# Patient Record
Sex: FEMALE | Race: WHITE | NOT HISPANIC OR LATINO | URBAN - METROPOLITAN AREA
[De-identification: names, ages, dates, MRNs, and addresses within clinical notes are randomized per-mention and may not be internally consistent; named-entity substitution may affect disease eponyms.]

---

## 2021-10-10 ENCOUNTER — EMERGENCY (EMERGENCY)
Facility: HOSPITAL | Age: 86
LOS: 1 days | Discharge: HOME | End: 2021-10-10
Attending: EMERGENCY MEDICINE
Payer: MEDICARE

## 2021-10-10 VITALS
HEART RATE: 63 BPM | TEMPERATURE: 99 F | RESPIRATION RATE: 17 BRPM | SYSTOLIC BLOOD PRESSURE: 145 MMHG | DIASTOLIC BLOOD PRESSURE: 74 MMHG | OXYGEN SATURATION: 98 % | HEIGHT: 66 IN

## 2021-10-10 PROCEDURE — 90471 IMMUNIZATION ADMIN: CPT

## 2021-10-10 PROCEDURE — 99283 EMERGENCY DEPT VISIT LOW MDM: CPT | Mod: 25

## 2021-10-10 PROCEDURE — 99283 EMERGENCY DEPT VISIT LOW MDM: CPT | Mod: GC

## 2021-10-10 PROCEDURE — 90715 TDAP VACCINE 7 YRS/> IM: CPT

## 2021-10-10 RX ORDER — CEPHALEXIN 500 MG
1 CAPSULE ORAL
Qty: 10 | Refills: 0
Start: 2021-10-10 | End: 2021-10-14

## 2021-10-10 RX ORDER — TETANUS TOXOID, REDUCED DIPHTHERIA TOXOID AND ACELLULAR PERTUSSIS VACCINE, ADSORBED 5; 2.5; 8; 8; 2.5 [IU]/.5ML; [IU]/.5ML; UG/.5ML; UG/.5ML; UG/.5ML
0.5 SUSPENSION INTRAMUSCULAR ONCE
Refills: 0 | Status: COMPLETED | OUTPATIENT
Start: 2021-10-10 | End: 2021-10-10

## 2021-10-10 RX ADMIN — TETANUS TOXOID, REDUCED DIPHTHERIA TOXOID AND ACELLULAR PERTUSSIS VACCINE, ADSORBED 0.5 MILLILITER(S): 5; 2.5; 8; 8; 2.5 SUSPENSION INTRAMUSCULAR at 19:43

## 2021-10-10 NOTE — ED ADULT NURSE NOTE - OBJECTIVE STATEMENT
Assumed pt care at 1902.  pt was scratched on left lower leg by her dog at home.  Pt washed the wound and put iodine on it.  Chronic bilateral lower extremity edema present.  Large skin tear present to left lower leg.

## 2021-10-10 NOTE — ED PROVIDER NOTE - PATIENT PORTAL LINK FT
You can access the FollowMyHealth Patient Portal offered by [facility] by registering at the following website: [email or web address]. By joining [employer]’s FollowMyHealth portal, you will also be able to view your health information using other applications (apps) compatible with our system.

## 2021-10-10 NOTE — ED PROVIDER NOTE - NSFOLLOWUPINSTRUCTIONS_ED_ALL_ED_FT
1. Follow up with your primary care physician within 2-3days for reevaluation.  2.  Return to the Emergency Department for worsening, progressive or any other concerning symptoms.  3.  Keep wound clean and dry.  4. Complete your entire course of antibiotics as prescribed. Do not stop taking antibiotics even if your symptoms improve.     Laceration    A laceration is a cut that goes through all of the layers of the skin and into the tissue that is right under the skin. Some lacerations heal on their own. Others need to be closed with skin adhesive strips, skin glue, stitches (sutures), or staples. Proper laceration care minimizes the risk of infection and helps the laceration to heal better.  If non-absorbable stitches or staples have been placed, they must be taken out within the time frame instructed by your healthcare provider.    SEEK IMMEDIATE MEDICAL CARE IF YOU HAVE ANY OF THE FOLLOWING SYMPTOMS: swelling around the wound, worsening pain, drainage from the wound, red streaking going away from your wound, inability to move finger or toe near the laceration, or discoloration of skin near the laceration.

## 2021-10-10 NOTE — ED PROVIDER NOTE - PHYSICAL EXAMINATION
Gen: NAD, AOx3  Head: NCAT  HEENT: oral mucosa moist normal conjunctiva  Lung: no respiratory distress  CV: normal perfusion  MSK: No edema, no visible deformities, full range of motion in all 4 extremities  Neuro: No focal neurologic deficits  Skin: skin tear present to L anterior shin extending to subcutaneous tissue not amenable to sutures  Psych: normal affect

## 2021-10-10 NOTE — ED PROVIDER NOTE - ATTENDING CONTRIBUTION TO CARE
HPI/PE/ROS/MDM BY ME.       I performed a history and physical exam of the patient and discussed their management with the resident. I reviewed the resident's note and agree with the documented findings and plan of care. My medical decision making and observations are found above.

## 2021-10-10 NOTE — ED PROVIDER NOTE - OBJECTIVE STATEMENT
89yo female with PMH HTN presenting with laceration to L anterior shin. Patient states that she was scratched by family dog, denies dog bite. Last tetanus unknown. Denies AC. Ambulatory upon arrivfal.

## 2021-10-10 NOTE — ED ADULT TRIAGE NOTE - CHIEF COMPLAINT QUOTE
deep wound to left lower leg "scratched or bit by family  dog"  (vaccines UTD) ; + active bleed, pressure dressing placed.  denies anticoag use. unk tetanus.

## 2021-10-10 NOTE — ED PROVIDER NOTE - CLINICAL SUMMARY MEDICAL DECISION MAKING FREE TEXT BOX
Patient with laceration to L shin 2/2 dog scratch, no bite, laceration cleaned and dressed by Dr. Priest, unfortunately skin is too thin for repair with sutures. Will dc home with antibiotics and update tetanus. Allison Alvarado DO

## 2022-12-16 ENCOUNTER — EMERGENCY (EMERGENCY)
Facility: HOSPITAL | Age: 87
LOS: 1 days | Discharge: HOME | End: 2022-12-16
Attending: EMERGENCY MEDICINE
Payer: MEDICARE

## 2022-12-16 VITALS
TEMPERATURE: 98 F | HEART RATE: 72 BPM | RESPIRATION RATE: 18 BRPM | SYSTOLIC BLOOD PRESSURE: 124 MMHG | OXYGEN SATURATION: 99 % | DIASTOLIC BLOOD PRESSURE: 71 MMHG

## 2022-12-16 VITALS
RESPIRATION RATE: 18 BRPM | DIASTOLIC BLOOD PRESSURE: 73 MMHG | TEMPERATURE: 99 F | HEART RATE: 70 BPM | OXYGEN SATURATION: 98 % | SYSTOLIC BLOOD PRESSURE: 129 MMHG | HEIGHT: 65 IN | WEIGHT: 164.91 LBS

## 2022-12-16 LAB
ALBUMIN SERPL ELPH-MCNC: 3.6 G/DL — SIGNIFICANT CHANGE UP (ref 3.3–5.2)
ALP SERPL-CCNC: 82 U/L — SIGNIFICANT CHANGE UP (ref 40–120)
ALT FLD-CCNC: 13 U/L — SIGNIFICANT CHANGE UP
ANION GAP SERPL CALC-SCNC: 11 MMOL/L — SIGNIFICANT CHANGE UP (ref 5–17)
AST SERPL-CCNC: 16 U/L — SIGNIFICANT CHANGE UP
BASOPHILS # BLD AUTO: 0.03 K/UL — SIGNIFICANT CHANGE UP (ref 0–0.2)
BASOPHILS NFR BLD AUTO: 0.3 % — SIGNIFICANT CHANGE UP (ref 0–2)
BILIRUB SERPL-MCNC: 0.6 MG/DL — SIGNIFICANT CHANGE UP (ref 0.4–2)
BUN SERPL-MCNC: 16.1 MG/DL — SIGNIFICANT CHANGE UP (ref 8–20)
CALCIUM SERPL-MCNC: 9.2 MG/DL — SIGNIFICANT CHANGE UP (ref 8.4–10.5)
CHLORIDE SERPL-SCNC: 99 MMOL/L — SIGNIFICANT CHANGE UP (ref 96–108)
CO2 SERPL-SCNC: 26 MMOL/L — SIGNIFICANT CHANGE UP (ref 22–29)
CREAT SERPL-MCNC: 0.79 MG/DL — SIGNIFICANT CHANGE UP (ref 0.5–1.3)
EGFR: 71 ML/MIN/1.73M2 — SIGNIFICANT CHANGE UP
EOSINOPHIL # BLD AUTO: 0.07 K/UL — SIGNIFICANT CHANGE UP (ref 0–0.5)
EOSINOPHIL NFR BLD AUTO: 0.6 % — SIGNIFICANT CHANGE UP (ref 0–6)
GLUCOSE SERPL-MCNC: 94 MG/DL — SIGNIFICANT CHANGE UP (ref 70–99)
HCT VFR BLD CALC: 40.7 % — SIGNIFICANT CHANGE UP (ref 34.5–45)
HGB BLD-MCNC: 13.4 G/DL — SIGNIFICANT CHANGE UP (ref 11.5–15.5)
IMM GRANULOCYTES NFR BLD AUTO: 0.4 % — SIGNIFICANT CHANGE UP (ref 0–0.9)
LACTATE BLDV-MCNC: 1.3 MMOL/L — SIGNIFICANT CHANGE UP (ref 0.5–2)
LYMPHOCYTES # BLD AUTO: 1.25 K/UL — SIGNIFICANT CHANGE UP (ref 1–3.3)
LYMPHOCYTES # BLD AUTO: 11.2 % — LOW (ref 13–44)
MCHC RBC-ENTMCNC: 31.3 PG — SIGNIFICANT CHANGE UP (ref 27–34)
MCHC RBC-ENTMCNC: 32.9 GM/DL — SIGNIFICANT CHANGE UP (ref 32–36)
MCV RBC AUTO: 95.1 FL — SIGNIFICANT CHANGE UP (ref 80–100)
MONOCYTES # BLD AUTO: 1.11 K/UL — HIGH (ref 0–0.9)
MONOCYTES NFR BLD AUTO: 9.9 % — SIGNIFICANT CHANGE UP (ref 2–14)
NEUTROPHILS # BLD AUTO: 8.7 K/UL — HIGH (ref 1.8–7.4)
NEUTROPHILS NFR BLD AUTO: 77.6 % — HIGH (ref 43–77)
PLATELET # BLD AUTO: 217 K/UL — SIGNIFICANT CHANGE UP (ref 150–400)
POTASSIUM SERPL-MCNC: 4 MMOL/L — SIGNIFICANT CHANGE UP (ref 3.5–5.3)
POTASSIUM SERPL-SCNC: 4 MMOL/L — SIGNIFICANT CHANGE UP (ref 3.5–5.3)
PROT SERPL-MCNC: 7.2 G/DL — SIGNIFICANT CHANGE UP (ref 6.6–8.7)
RBC # BLD: 4.28 M/UL — SIGNIFICANT CHANGE UP (ref 3.8–5.2)
RBC # FLD: 12.5 % — SIGNIFICANT CHANGE UP (ref 10.3–14.5)
SODIUM SERPL-SCNC: 135 MMOL/L — SIGNIFICANT CHANGE UP (ref 135–145)
WBC # BLD: 11.21 K/UL — HIGH (ref 3.8–10.5)
WBC # FLD AUTO: 11.21 K/UL — HIGH (ref 3.8–10.5)

## 2022-12-16 PROCEDURE — 83605 ASSAY OF LACTIC ACID: CPT

## 2022-12-16 PROCEDURE — 99284 EMERGENCY DEPT VISIT MOD MDM: CPT | Mod: 25

## 2022-12-16 PROCEDURE — 87040 BLOOD CULTURE FOR BACTERIA: CPT

## 2022-12-16 PROCEDURE — 99284 EMERGENCY DEPT VISIT MOD MDM: CPT | Mod: FS

## 2022-12-16 PROCEDURE — 80053 COMPREHEN METABOLIC PANEL: CPT

## 2022-12-16 PROCEDURE — 73590 X-RAY EXAM OF LOWER LEG: CPT | Mod: 26,RT

## 2022-12-16 PROCEDURE — 96374 THER/PROPH/DIAG INJ IV PUSH: CPT

## 2022-12-16 PROCEDURE — 85025 COMPLETE CBC W/AUTO DIFF WBC: CPT

## 2022-12-16 PROCEDURE — 36415 COLL VENOUS BLD VENIPUNCTURE: CPT

## 2022-12-16 PROCEDURE — 93971 EXTREMITY STUDY: CPT

## 2022-12-16 PROCEDURE — 73590 X-RAY EXAM OF LOWER LEG: CPT

## 2022-12-16 PROCEDURE — 93971 EXTREMITY STUDY: CPT | Mod: 26,RT

## 2022-12-16 RX ORDER — CEFAZOLIN SODIUM 1 G
2000 VIAL (EA) INJECTION ONCE
Refills: 0 | Status: COMPLETED | OUTPATIENT
Start: 2022-12-16 | End: 2022-12-16

## 2022-12-16 RX ORDER — CEFAZOLIN SODIUM 1 G
2000 VIAL (EA) INJECTION ONCE
Refills: 0 | Status: DISCONTINUED | OUTPATIENT
Start: 2022-12-16 | End: 2022-12-16

## 2022-12-16 RX ORDER — CEPHALEXIN 500 MG
1 CAPSULE ORAL
Qty: 28 | Refills: 0
Start: 2022-12-16 | End: 2022-12-22

## 2022-12-16 RX ADMIN — Medication 2000 MILLIGRAM(S): at 14:43

## 2022-12-16 NOTE — ED STATDOCS - OBJECTIVE STATEMENT
89 y/o female with pmhx of HTN on losartan, c/o redness and blister developing to R ankle over the past day that has opened with blood oozing. Daughter states blister originated from bruising that was there for 2 weeks. Denies allergies or hx of DM. No fever.

## 2022-12-16 NOTE — ED ADULT NURSE NOTE - CHIEF COMPLAINT QUOTE
pt states she had a wound to her right leg that popped , started as a blister  1 yesterday 1 today, needs to have it checked, it started out as a bruise  A&Ox3, resp wnl, right leg red

## 2022-12-16 NOTE — ED ADULT NURSE NOTE - OBJECTIVE STATEMENT
Pt comes in complaining of R ankle pain and swelling. As per pt's family members, pt has always had the swelling due to sitting down a lot but she recently hit her R foot against her chair which caused a bruise and two blood blisters. Pt endorses trouble walking on R foot and complains of her R ankle being tender to touch. PMH denied.

## 2022-12-16 NOTE — ED STATDOCS - PATIENT PORTAL LINK FT
You can access the FollowMyHealth Patient Portal offered by [facility] by registering at the following website: [email or web address]. By joining [employer]’s FollowMyHealth portal, you will also be able to view your health information using other applications (apps) compatible with our system.

## 2022-12-16 NOTE — ED STATDOCS - NSFOLLOWUPINSTRUCTIONS_ED_ALL_ED_FT
please continue Antibiotic with meal   leg elevation   please call and follow up with primary care within 2 days and bring the results   come back if any worsen of the redness - swollen - pus drainage - redness pass the current area or any new concern     Cellulitis    Cellulitis is a skin infection caused by bacteria. This condition occurs most often in the arms and lower legs but can occur anywhere over the body. Symptoms include redness, swelling, warm skin, tenderness, and chills/fever. If you were prescribed an antibiotic medicine, take it as told by your health care provider. Do not stop taking the antibiotic even if you start to feel better.    SEEK IMMEDIATE MEDICAL CARE IF YOU HAVE ANY OF THE FOLLOWING SYMPTOMS: worsening fever, red streaks coming from affected area, vomiting or diarrhea, or dizziness/lightheadedness.

## 2022-12-16 NOTE — ED ADULT TRIAGE NOTE - CHIEF COMPLAINT QUOTE
pt states she had a wound to her right leg that popped , started as a blister  1 yesterday 1 today, needs to have it checked, it started out as a bruise  A&Ox3, resp wnl pt states she had a wound to her right leg that popped , started as a blister  1 yesterday 1 today, needs to have it checked, it started out as a bruise  A&Ox3, resp wnl, right leg red

## 2022-12-16 NOTE — ED STATDOCS - CLINICAL SUMMARY MEDICAL DECISION MAKING FREE TEXT BOX
Hx of bruise with redness to RLE. Most Likely cellulites. Will check labs, US of RLE, XR of tib/fib and dose of IV antibiotics.

## 2022-12-16 NOTE — ED STATDOCS - NS_ ATTENDINGSCRIBEDETAILS _ED_A_ED_FT
I, Maninder Ponce, performed the initial face to face bedside interview with this patient regarding history of present illness, review of symptoms and relevant past medical, social and family history.  I completed an independent physical examination.  I was the initial provider who evaluated this patient. I have signed out the follow up of any pending tests (i.e. labs, radiological studies) to the ACP.  I have communicated the patient’s plan of care and disposition with the ACP.  The history, relevant review of systems, past medical and surgical history, medical decision making, and physical examination was documented by the scribe in my presence and I attest to the accuracy of the documentation.

## 2022-12-16 NOTE — ED STATDOCS - PROGRESS NOTE DETAILS
pt is sen by Dr malhotra initially agreed With hx , PE and plan seen the pt at the bed side   explained labs and some elevated WBC explained , pending US.   pt's daughter at the bed side - given the option for OBS and IV abx  they were make decision about it US negative - spoke with pt and pt's daughter still wants to try out pt with tablet will f.u with her pcp   dressing changed .

## 2022-12-21 LAB
CULTURE RESULTS: SIGNIFICANT CHANGE UP
CULTURE RESULTS: SIGNIFICANT CHANGE UP
SPECIMEN SOURCE: SIGNIFICANT CHANGE UP
SPECIMEN SOURCE: SIGNIFICANT CHANGE UP

## 2023-07-26 NOTE — ED ADULT NURSE NOTE - CAS EDN DISCHARGE ASSESSMENT
-- DO NOT REPLY / DO NOT REPLY ALL --  -- Message is from Engagement Center Operations ([facility]) --    Offered Waitlist if Available for the Visit Type? Yes    Caller is requesting an appointment - at a sooner time than what was available.      Caller wants sooner appointment - offered other approved options    Reason for Visit: Is calling stated patient is not able to attend office visit alone and she will not be available on 08/24 but need to be seen for MWV     Is the patient currently scheduled? Yes.  Appointment date:  08/24/2023    Preferred time to be seen: Anytime     Caller Information       Type Contact Phone/Fax    07/26/2023 11:06 AM CDT Phone (Incoming) ana ledbetter (Emergency Contact) [phone]          Alternative phone number: none    Can a detailed message be left? Yes    Message Turnaround:     [place]:    Please give this turnaround time to the caller:   \"This message will be sent to [state Provider's name]. The clinical team will fulfill your request as soon as they review your message.\"   Alert and oriented to person, place and time

## 2024-06-09 DIAGNOSIS — M25.562 PAIN IN RIGHT KNEE: ICD-10-CM

## 2024-06-09 DIAGNOSIS — M25.561 PAIN IN RIGHT KNEE: ICD-10-CM

## 2024-06-11 ENCOUNTER — APPOINTMENT (OUTPATIENT)
Dept: ORTHOPEDIC SURGERY | Facility: CLINIC | Age: 89
End: 2024-06-11
Payer: MEDICARE

## 2024-06-11 VITALS
WEIGHT: 163 LBS | DIASTOLIC BLOOD PRESSURE: 81 MMHG | SYSTOLIC BLOOD PRESSURE: 124 MMHG | HEIGHT: 65 IN | BODY MASS INDEX: 27.16 KG/M2 | HEART RATE: 58 BPM

## 2024-06-11 PROCEDURE — 99203 OFFICE O/P NEW LOW 30 MIN: CPT | Mod: 25

## 2024-06-11 PROCEDURE — G2211 COMPLEX E/M VISIT ADD ON: CPT

## 2024-06-11 PROCEDURE — 73564 X-RAY EXAM KNEE 4 OR MORE: CPT | Mod: 50

## 2024-06-11 PROCEDURE — 20610 DRAIN/INJ JOINT/BURSA W/O US: CPT | Mod: 50

## 2024-06-11 RX ORDER — DICLOFENAC SODIUM 1% 10 MG/G
1 GEL TOPICAL DAILY
Qty: 1 | Refills: 1 | Status: ACTIVE | COMMUNITY
Start: 2024-06-11 | End: 1900-01-01

## 2024-06-11 NOTE — PHYSICAL EXAM
[Normal] : Gait: normal [de-identified] : GENERAL APPEARANCE: Well nourished and hydrated, pleasant, alert, and oriented x 3. Appears their stated age.  HEENT: Normocephalic, extraocular eye motion intact. Nasal septum midline. Oral cavity clear. External auditory canal clear.  RESPIRATORY: Breath sounds clear and audible in all lobes. No wheezing, No accessory muscle use. CARDIOVASCULAR: No apparent abnormalities. No lower leg edema. No varicosities. Pedal pulses are palpable. NEUROLOGIC: Sensation is normal, no muscle weakness in the upper or lower extremities. DERMATOLOGIC: No apparent skin lesions, moist, warm, no rash. SPINE: Cervical spine appears normal and moves freely; thoracic spine appears normal and moves freely; lumbosacral spine appears normal and moves freely, normal, nontender. MUSCULOSKELETAL: Hands, wrists, and elbows are normal and move freely, shoulders are normal and move freely.  Psychiatric: Oriented to person, place, and time, insight and judgement were intact and the affect was normal.  [de-identified] : Musculoskeletal: Left knee exam shows no effusion, ROM is 0-115 degrees, no instability, no pain with Samreen, no joint line tenderness. Right knee exam shows no effusion, ROM is 0-115 degrees, no instability, no pain with Samreen, no joint line tenderness. 5/5 motor strength in bilateral lower extremities. Sensory: Intact in bilateral lower extremities. DTRs: Biceps, brachioradialis, triceps, patellar, ankle and plantar 2+ and symmetric bilaterally. Pulses: dorsalis pedis, posterior tibial, femoral, popliteal, and radial 2+ and symmetric bilaterally.  [de-identified] : 4 views of the bilateral knees obtained the office today show no acute fracture or dislocation.  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  For the right knee there is severe medial joint space narrowing bone-on-bone osteoarthritis tricompartmental degenerative changes consistent with Kellgren-Jonathan grade 4 changes

## 2024-06-11 NOTE — PROCEDURE
[de-identified] : Euflexxa # 1 Right knee Discussed at length with the patient the planned Euflexxa injection. The risks, benefits, convalescence and alternatives were reviewed. The possible side effects discussed included but were not limited to: pain, swelling, heat and redness. There symptoms are generally mild but if they are extensive then contact the office. Giving pain relievers by mouth such as NSAIDs or Tylenol can generally treat the reactions to Euflexxa. Rare cases of infection have been noted. Rash, hives and itching may occur post injection. If you have muscle pain or cramps, flushing and or swelling of the face, rapid heart beat, nausea, dizziness, fever, chills, headache, difficulty breathing, swelling in the arms or legs, or have a prickly feeling of your skin, contact a health care provider immediately.  Following this discussion, the knee was prepped with betadine and under sterile condition the Euflexxa injection was performed with a 22 gauge needle. The needle was introduced into the joint, aspiration was performed to ensure intra-articular placement and the medication was injected. Upon withdrawal of the needle the site was cleaned with alcohol and a bandaid applied. The patient tolerated the injection well and there were no adverse effects. Post injection instructions included no strenuous activity for 24 hours, cryotherapy and if there are any adverse effects to contact the office.  Euflexxa # 1 Left knee Discussed at length with the patient the planned Euflexxa injection. The risks, benefits, convalescence and alternatives were reviewed. The possible side effects discussed included but were not limited to: pain, swelling, heat and redness. There symptoms are generally mild but if they are extensive then contact the office. Giving pain relievers by mouth such as NSAIDs or Tylenol can generally treat the reactions to Euflexxa. Rare cases of infection have been noted. Rash, hives and itching may occur post injection. If you have muscle pain or cramps, flushing and or swelling of the face, rapid heart beat, nausea, dizziness, fever, chills, headache, difficulty breathing, swelling in the arms or legs, or have a prickly feeling of your skin, contact a health care provider immediately.  Following this discussion, the knee was prepped with betadine and under sterile condition the Euflexxa injection was performed with a 22 gauge needle. The needle was introduced into the joint, aspiration was performed to ensure intra-articular placement and the medication was injected. Upon withdrawal of the needle the site was cleaned with alcohol and a bandaid applied. The patient tolerated the injection well and there were no adverse effects. Post injection instructions included no strenuous activity for 24 hours, cryotherapy and if there are any adverse effects to contact the office.

## 2024-06-11 NOTE — ADDENDUM
[FreeTextEntry1] : This note was written by Snow Clinton, acting as the  for Dr. Jansen. This note accurately reflects the work and decisions made by Dr. Jansen.

## 2024-06-11 NOTE — REASON FOR VISIT
[Initial Visit] : an initial visit for [Family Member] : family member [FreeTextEntry2] : bilateral knee pain

## 2024-06-11 NOTE — DISCUSSION/SUMMARY
[Medication Risks Reviewed] : Medication risks reviewed [Surgical risks reviewed] : Surgical risks reviewed [de-identified] : Patient is a 89-year-old female with severe bilateral knee osteoarthritis presenting today for initial evaluation.  At this time she is not a surgical candidate.  She like to continue with conservative treatment.  I gave her injection Euflexxa in the bilateral knees which she tolerated well.  We discussed low impact activity and exercise.  I recommended diclofenac gel.  I will see her back in 1 week for repeat injection.  All questions were asked and answered

## 2024-06-11 NOTE — HISTORY OF PRESENT ILLNESS
[Pain Location] : pain [] : right & left knee [Worsening] : worsening [___ mths] : [unfilled] month(s) ago [Constant] : ~He/She~ states the symptoms seem to be constant [Walking] : worsened by walking [Knee Flexion] : worsened with knee flexion [Knee Extension] : worsened with knee extension [de-identified] : 89 year old female presents to the office today for initial visit for her bilateral knee pain. Despite effort to relieve pain, including viscosupplementation, anti-inflammatory cream, rest, and Aleve. The patient denies any falls or trauma and has been using conservative treatment. No physical therapy used for relief of pain. No constitutional symptoms noted. [Physical Therapy] : not relieved by physical therapy [de-identified] : viscosupplementation, anti-inflammatory cream, rest, and Aleve.

## 2024-06-11 NOTE — REVIEW OF SYSTEMS
[Joint Pain] : joint pain [Joint Stiffness] : joint stiffness [Negative] : Heme/Lymph [FreeTextEntry9] : bilateral knee pain

## 2024-06-18 RX ORDER — HYALURONATE SODIUM 20 MG/2 ML
20 SYRINGE (ML) INTRAARTICULAR
Qty: 2 | Refills: 0 | Status: ACTIVE | OUTPATIENT
Start: 2024-06-11

## 2024-06-19 ENCOUNTER — APPOINTMENT (OUTPATIENT)
Dept: ORTHOPEDIC SURGERY | Facility: CLINIC | Age: 89
End: 2024-06-19
Payer: MEDICARE

## 2024-06-19 VITALS
HEART RATE: 62 BPM | BODY MASS INDEX: 27.16 KG/M2 | WEIGHT: 163 LBS | SYSTOLIC BLOOD PRESSURE: 124 MMHG | HEIGHT: 65 IN | DIASTOLIC BLOOD PRESSURE: 82 MMHG

## 2024-06-19 PROCEDURE — 20610 DRAIN/INJ JOINT/BURSA W/O US: CPT | Mod: 50

## 2024-06-19 NOTE — HISTORY OF PRESENT ILLNESS
[de-identified] : 89-year-old female presents office for Euflexxa injection 2 out of 3 to bilateral knees.

## 2024-06-19 NOTE — PROCEDURE
[de-identified] : Euflexxa # 2 Right knee Discussed at length with the patient the planned Euflexxa injection. The risks, benefits, convalescence and alternatives were reviewed. The possible side effects discussed included but were not limited to: pain, swelling, heat and redness. There symptoms are generally mild but if they are extensive then contact the office. Giving pain relievers by mouth such as NSAIDs or Tylenol can generally treat the reactions to Euflexxa. Rare cases of infection have been noted. Rash, hives and itching may occur post injection. If you have muscle pain or cramps, flushing and or swelling of the face, rapid heart beat, nausea, dizziness, fever, chills, headache, difficulty breathing, swelling in the arms or legs, or have a prickly feeling of your skin, contact a health care provider immediately.  Following this discussion, the knee was prepped with betadine and under sterile condition the Euflexxa injection was performed with a 22 gauge needle. The needle was introduced into the joint, aspiration was performed to ensure intra-articular placement and the medication was injected. Upon withdrawal of the needle the site was cleaned with alcohol and a bandaid applied. The patient tolerated the injection well and there were no adverse effects. Post injection instructions included no strenuous activity for 24 hours, cryotherapy and if there are any adverse effects to contact the office.  Euflexxa # 2 Left knee Discussed at length with the patient the planned Euflexxa injection. The risks, benefits, convalescence and alternatives were reviewed. The possible side effects discussed included but were not limited to: pain, swelling, heat and redness. There symptoms are generally mild but if they are extensive then contact the office. Giving pain relievers by mouth such as NSAIDs or Tylenol can generally treat the reactions to Euflexxa. Rare cases of infection have been noted. Rash, hives and itching may occur post injection. If you have muscle pain or cramps, flushing and or swelling of the face, rapid heart beat, nausea, dizziness, fever, chills, headache, difficulty breathing, swelling in the arms or legs, or have a prickly feeling of your skin, contact a health care provider immediately.  Following this discussion, the knee was prepped with betadine and under sterile condition the Euflexxa injection was performed with a 22 gauge needle. The needle was introduced into the joint, aspiration was performed to ensure intra-articular placement and the medication was injected. Upon withdrawal of the needle the site was cleaned with alcohol and a bandaid applied. The patient tolerated the injection well and there were no adverse effects. Post injection instructions included no strenuous activity for 24 hours, cryotherapy and if there are any adverse effects to contact the office.

## 2024-06-19 NOTE — DISCUSSION/SUMMARY
[Medication Risks Reviewed] : Medication risks reviewed [Surgical risks reviewed] : Surgical risks reviewed [de-identified] : 89-year-old female presents to the office status post Euflexxa injection 2 out of 3 to bilateral knees.  Tolerated well.  Will follow-up in 1 week for repeat injection.  All questions addressed, patient agreement with plan.

## 2024-06-26 ENCOUNTER — APPOINTMENT (OUTPATIENT)
Dept: ORTHOPEDIC SURGERY | Facility: CLINIC | Age: 89
End: 2024-06-26
Payer: MEDICARE

## 2024-06-26 DIAGNOSIS — M17.0 BILATERAL PRIMARY OSTEOARTHRITIS OF KNEE: ICD-10-CM

## 2024-06-26 PROCEDURE — 20610 DRAIN/INJ JOINT/BURSA W/O US: CPT | Mod: 50

## 2024-07-22 ENCOUNTER — NON-APPOINTMENT (OUTPATIENT)
Age: 89
End: 2024-07-22

## 2025-01-17 ENCOUNTER — INPATIENT (INPATIENT)
Facility: HOSPITAL | Age: 89
LOS: 4 days | Discharge: NURSING, REHAB OR LONG-TERM CARE | DRG: 93 | End: 2025-01-22
Attending: STUDENT IN AN ORGANIZED HEALTH CARE EDUCATION/TRAINING PROGRAM | Admitting: HOSPITALIST
Payer: MEDICARE

## 2025-01-17 VITALS
RESPIRATION RATE: 20 BRPM | DIASTOLIC BLOOD PRESSURE: 75 MMHG | TEMPERATURE: 98 F | SYSTOLIC BLOOD PRESSURE: 164 MMHG | OXYGEN SATURATION: 98 % | HEART RATE: 96 BPM

## 2025-01-17 DIAGNOSIS — R47.81 SLURRED SPEECH: ICD-10-CM

## 2025-01-17 LAB
ALBUMIN SERPL ELPH-MCNC: 3.2 G/DL — LOW (ref 3.3–5.2)
ALP SERPL-CCNC: 80 U/L — SIGNIFICANT CHANGE UP (ref 40–120)
ALT FLD-CCNC: 32 U/L — SIGNIFICANT CHANGE UP
ANION GAP SERPL CALC-SCNC: 12 MMOL/L — SIGNIFICANT CHANGE UP (ref 5–17)
ANISOCYTOSIS BLD QL: SLIGHT — SIGNIFICANT CHANGE UP
APPEARANCE UR: CLEAR — SIGNIFICANT CHANGE UP
APTT BLD: 36.6 SEC — HIGH (ref 24.5–35.6)
AST SERPL-CCNC: 37 U/L — HIGH
BACTERIA # UR AUTO: ABNORMAL /HPF
BASOPHILS # BLD AUTO: 0.12 K/UL — SIGNIFICANT CHANGE UP (ref 0–0.2)
BASOPHILS NFR BLD AUTO: 1.7 % — SIGNIFICANT CHANGE UP (ref 0–2)
BILIRUB SERPL-MCNC: 0.3 MG/DL — LOW (ref 0.4–2)
BILIRUB UR-MCNC: NEGATIVE — SIGNIFICANT CHANGE UP
BUN SERPL-MCNC: 30.2 MG/DL — HIGH (ref 8–20)
CALCIUM SERPL-MCNC: 9.3 MG/DL — SIGNIFICANT CHANGE UP (ref 8.4–10.5)
CAST: 1 /LPF — SIGNIFICANT CHANGE UP (ref 0–4)
CHLORIDE SERPL-SCNC: 96 MMOL/L — SIGNIFICANT CHANGE UP (ref 96–108)
CO2 SERPL-SCNC: 25 MMOL/L — SIGNIFICANT CHANGE UP (ref 22–29)
COLOR SPEC: YELLOW — SIGNIFICANT CHANGE UP
CREAT SERPL-MCNC: 0.93 MG/DL — SIGNIFICANT CHANGE UP (ref 0.5–1.3)
DIFF PNL FLD: NEGATIVE — SIGNIFICANT CHANGE UP
EGFR: 57 ML/MIN/1.73M2 — LOW
EOSINOPHIL # BLD AUTO: 0.12 K/UL — SIGNIFICANT CHANGE UP (ref 0–0.5)
EOSINOPHIL NFR BLD AUTO: 1.7 % — SIGNIFICANT CHANGE UP (ref 0–6)
GIANT PLATELETS BLD QL SMEAR: PRESENT — SIGNIFICANT CHANGE UP
GLUCOSE SERPL-MCNC: 79 MG/DL — SIGNIFICANT CHANGE UP (ref 70–99)
GLUCOSE UR QL: NEGATIVE MG/DL — SIGNIFICANT CHANGE UP
HCT VFR BLD CALC: 30.7 % — LOW (ref 34.5–45)
HGB BLD-MCNC: 10.2 G/DL — LOW (ref 11.5–15.5)
INR BLD: 0.98 RATIO — SIGNIFICANT CHANGE UP (ref 0.85–1.16)
KETONES UR-MCNC: ABNORMAL MG/DL
LEUKOCYTE ESTERASE UR-ACNC: NEGATIVE — SIGNIFICANT CHANGE UP
LYMPHOCYTES # BLD AUTO: 1.01 K/UL — SIGNIFICANT CHANGE UP (ref 1–3.3)
LYMPHOCYTES # BLD AUTO: 14.7 % — SIGNIFICANT CHANGE UP (ref 13–44)
MACROCYTES BLD QL: SLIGHT — SIGNIFICANT CHANGE UP
MANUAL SMEAR VERIFICATION: SIGNIFICANT CHANGE UP
MCHC RBC-ENTMCNC: 30.6 PG — SIGNIFICANT CHANGE UP (ref 27–34)
MCHC RBC-ENTMCNC: 33.2 G/DL — SIGNIFICANT CHANGE UP (ref 32–36)
MCV RBC AUTO: 92.2 FL — SIGNIFICANT CHANGE UP (ref 80–100)
METAMYELOCYTES # FLD: 0.9 % — HIGH (ref 0–0)
METAMYELOCYTES NFR BLD: 0.9 % — HIGH (ref 0–0)
MONOCYTES # BLD AUTO: 0.41 K/UL — SIGNIFICANT CHANGE UP (ref 0–0.9)
MONOCYTES NFR BLD AUTO: 6 % — SIGNIFICANT CHANGE UP (ref 2–14)
NEUTROPHILS # BLD AUTO: 4.69 K/UL — SIGNIFICANT CHANGE UP (ref 1.8–7.4)
NEUTROPHILS NFR BLD AUTO: 68.1 % — SIGNIFICANT CHANGE UP (ref 43–77)
NITRITE UR-MCNC: NEGATIVE — SIGNIFICANT CHANGE UP
PH UR: 7 — SIGNIFICANT CHANGE UP (ref 5–8)
PLAT MORPH BLD: NORMAL — SIGNIFICANT CHANGE UP
PLATELET # BLD AUTO: 144 K/UL — LOW (ref 150–400)
POTASSIUM SERPL-MCNC: 4.7 MMOL/L — SIGNIFICANT CHANGE UP (ref 3.5–5.3)
POTASSIUM SERPL-SCNC: 4.7 MMOL/L — SIGNIFICANT CHANGE UP (ref 3.5–5.3)
PROT SERPL-MCNC: 6.4 G/DL — LOW (ref 6.6–8.7)
PROT UR-MCNC: NEGATIVE MG/DL — SIGNIFICANT CHANGE UP
PROTHROM AB SERPL-ACNC: 11.4 SEC — SIGNIFICANT CHANGE UP (ref 9.9–13.4)
RBC # BLD: 3.33 M/UL — LOW (ref 3.8–5.2)
RBC # FLD: 15.7 % — HIGH (ref 10.3–14.5)
RBC BLD AUTO: ABNORMAL
RBC CASTS # UR COMP ASSIST: 1 /HPF — SIGNIFICANT CHANGE UP (ref 0–4)
SMUDGE CELLS # BLD: PRESENT — SIGNIFICANT CHANGE UP
SODIUM SERPL-SCNC: 133 MMOL/L — LOW (ref 135–145)
SP GR SPEC: >1.03 — HIGH (ref 1–1.03)
SQUAMOUS # UR AUTO: 1 /HPF — SIGNIFICANT CHANGE UP (ref 0–5)
TROPONIN T, HIGH SENSITIVITY RESULT: 32 NG/L — SIGNIFICANT CHANGE UP (ref 0–51)
UROBILINOGEN FLD QL: 0.2 MG/DL — SIGNIFICANT CHANGE UP (ref 0.2–1)
VARIANT LYMPHS # BLD: 6.9 % — HIGH (ref 0–6)
VARIANT LYMPHS NFR BLD MANUAL: 6.9 % — HIGH (ref 0–6)
WBC # BLD: 6.88 K/UL — SIGNIFICANT CHANGE UP (ref 3.8–10.5)
WBC # FLD AUTO: 6.88 K/UL — SIGNIFICANT CHANGE UP (ref 3.8–10.5)
WBC UR QL: 0 /HPF — SIGNIFICANT CHANGE UP (ref 0–5)

## 2025-01-17 PROCEDURE — 99285 EMERGENCY DEPT VISIT HI MDM: CPT | Mod: GC

## 2025-01-17 PROCEDURE — 71045 X-RAY EXAM CHEST 1 VIEW: CPT | Mod: 26

## 2025-01-17 PROCEDURE — 74176 CT ABD & PELVIS W/O CONTRAST: CPT | Mod: 26

## 2025-01-17 PROCEDURE — 99223 1ST HOSP IP/OBS HIGH 75: CPT

## 2025-01-17 PROCEDURE — 70450 CT HEAD/BRAIN W/O DYE: CPT | Mod: 26,XU

## 2025-01-17 PROCEDURE — 72125 CT NECK SPINE W/O DYE: CPT | Mod: 26

## 2025-01-17 PROCEDURE — 93010 ELECTROCARDIOGRAM REPORT: CPT

## 2025-01-17 PROCEDURE — 70496 CT ANGIOGRAPHY HEAD: CPT | Mod: 26

## 2025-01-17 PROCEDURE — 71250 CT THORAX DX C-: CPT | Mod: 26

## 2025-01-17 PROCEDURE — 73562 X-RAY EXAM OF KNEE 3: CPT | Mod: 26,RT

## 2025-01-17 PROCEDURE — 73502 X-RAY EXAM HIP UNI 2-3 VIEWS: CPT | Mod: 26,RT

## 2025-01-17 PROCEDURE — 70498 CT ANGIOGRAPHY NECK: CPT | Mod: 26

## 2025-01-17 PROCEDURE — 0042T: CPT

## 2025-01-17 PROCEDURE — 93970 EXTREMITY STUDY: CPT | Mod: 26

## 2025-01-17 PROCEDURE — 73700 CT LOWER EXTREMITY W/O DYE: CPT | Mod: 26,RT

## 2025-01-17 PROCEDURE — 73552 X-RAY EXAM OF FEMUR 2/>: CPT | Mod: 26,50

## 2025-01-17 RX ORDER — ACETAMINOPHEN, DIPHENHYDRAMINE HCL, PHENYLEPHRINE HCL 325; 25; 5 MG/1; MG/1; MG/1
3 TABLET ORAL AT BEDTIME
Refills: 0 | Status: DISCONTINUED | OUTPATIENT
Start: 2025-01-17 | End: 2025-01-20

## 2025-01-17 RX ORDER — ACETAMINOPHEN 160 MG/5ML
650 SUSPENSION ORAL EVERY 6 HOURS
Refills: 0 | Status: DISCONTINUED | OUTPATIENT
Start: 2025-01-17 | End: 2025-01-22

## 2025-01-17 RX ORDER — ENOXAPARIN SODIUM 100 MG/ML
40 INJECTION SUBCUTANEOUS EVERY 24 HOURS
Refills: 0 | Status: DISCONTINUED | OUTPATIENT
Start: 2025-01-17 | End: 2025-01-20

## 2025-01-17 RX ORDER — ATORVASTATIN CALCIUM 80 MG/1
40 TABLET, FILM COATED ORAL AT BEDTIME
Refills: 0 | Status: DISCONTINUED | OUTPATIENT
Start: 2025-01-17 | End: 2025-01-20

## 2025-01-17 RX ORDER — ONDANSETRON 4 MG/1
4 TABLET, ORALLY DISINTEGRATING ORAL EVERY 8 HOURS
Refills: 0 | Status: DISCONTINUED | OUTPATIENT
Start: 2025-01-17 | End: 2025-01-20

## 2025-01-17 RX ORDER — ASPIRIN 81 MG/1
81 TABLET, COATED ORAL DAILY
Refills: 0 | Status: DISCONTINUED | OUTPATIENT
Start: 2025-01-17 | End: 2025-01-18

## 2025-01-17 RX ORDER — MAGNESIUM, ALUMINUM HYDROXIDE 200-225/5
30 SUSPENSION, ORAL (FINAL DOSE FORM) ORAL EVERY 4 HOURS
Refills: 0 | Status: DISCONTINUED | OUTPATIENT
Start: 2025-01-17 | End: 2025-01-20

## 2025-01-17 NOTE — ED ADULT NURSE NOTE - ED STAT RN HANDOFF DETAILS
Pt report given to CDU RN Emmett. Pt remains on telebox and . Pt is resting in stretcher comfortably at this time, no apparent distress noted at this time. Pt safety maintained. Pt denies any complaints at this time. Pt family at bedside. Pending MRI.

## 2025-01-17 NOTE — H&P ADULT - ASSESSMENT
Writer called patient's mother. Mother reports that they were at [employer] yesterday and last night started getting a headache. Patient had Tylenol prior to bed last night. Mother reports patient still has a mild headache this morning. Mother denies recent injury or emergent symptoms (see protocol documentation). Writer gave home care and callback advice per protocol, mother verbalized understanding.      Reason for Disposition  • [1] MILD headache AND [2] present < 72 hours    Protocols used: HEADACHE-P-AH     89 yo female with pmhx HTN presenting to the ED with worsening left facial droop since 08:00 this morning.     Facial Droop r/o CVA  -Admit to telemetry  -Neuro checks and vitals signs Q4  -Out of TPA window  -Start Aspirin 81 mg, Atorvastatin 40 mg   -Check TTE  -A1c, TSH, Lipid panel in the am  -Trend Trops, CPK x3  -No role for for permissive HTN, sxs started 2 weeks ago  -Neuro consulted, appreciate recs  -PT/OT/SLP evaluations  -NPO pending dysphagia screen, then can resume with puree diet      Fall  -Unwitnessed fall prior to arrival, and second fall while here  -Imaging initially concerning for hip fx (Xray) but CT did not show fracture, just severe his arthrosis  -Ortho evaluated and signed off, no restrictions  -Fall risk protocol  -PT consulted    HTN  -Continue Atenolol 25 mg daily  -Continue Losartan 100 mg daily    VTEppx: Lovenox, SCDs  GOC:  Dispo: Pending PT eval, likely [facility] vs long term placement 89 yo female with pmhx HTN presenting to the ED with worsening left facial droop since 08:00 this morning.     Facial Droop r/o CVA  -Admit to telemetry  -Neuro checks and vitals signs Q4  -Out of TPA window  -Start Aspirin 81 mg, Atorvastatin 40 mg   -Check TTE  -A1c, TSH, Lipid panel in the am  -Trend Trops, CPK x3  -No role for for permissive HTN, sxs started 2 weeks ago  -Neuro consulted, appreciate recs  -PT/OT/SLP evaluations  -NPO pending dysphagia screen, then can resume with puree diet    Fall  -Unwitnessed fall prior to arrival, and second fall while here  -Imaging initially concerning for hip fx (Xray) but CT did not show fracture, just severe his arthrosis  -Ortho evaluated and signed off, no restrictions  -Fall risk protocol  -PT consulted    HTN  -Continue Atenolol 25 mg daily  -Continue Losartan 100 mg daily    Med rec: Obtained from Dr. Meija, please verify with daughter in am    VTEppx: Lovenox, SCDs  GOC: Could not reach family, please address with family in am as patient is AAOx2  Dispo: Pending PT eval, likely [facility] vs long term placement

## 2025-01-17 NOTE — H&P ADULT - NSHPPHYSICALEXAM_GEN_ALL_CORE
Vital Signs Last 24 Hrs  T(C): 36.6 (17 Jan 2025 16:18), Max: 36.7 (17 Jan 2025 14:25)  T(F): 97.9 (17 Jan 2025 16:18), Max: 98 (17 Jan 2025 14:25)  HR: 55 (18 Jan 2025 00:45) (55 - 96)  BP: 169/63 (18 Jan 2025 00:45) (157/71 - 169/63)  BP(mean): --  RR: 18 (18 Jan 2025 00:45) (18 - 20)  SpO2: 95% (18 Jan 2025 00:45) (95% - 99%)    Parameters below as of 18 Jan 2025 00:45  Patient On (Oxygen Delivery Method): room air    General: Age-appearing, in no acute distress  Head: Normocephalic, atraumatic  ENMT: EOMI, neck supple  Cardiovascular: +S1, S2; Regular rate and rhythm, no murmurs, rubs, gallops  Respiratory: CTA BL, no wheezes, rales, rhonchi  Gastrointestinal: Abdomen soft, non-tender, +BS in all 4 quadrants  Extremities: No clubbing, cyanosis, or edema  Vascular: 2+ pulses, cap refill < 2 seconds  Neuro: Left facial droop, AAOx2, sensation intact BL  Musculoskeletal: Normal tone, no deformities  Skin: Warm, dry; no acute rash seen  Psych: Appropriate, cooperative

## 2025-01-17 NOTE — ED ADULT NURSE NOTE - OBJECTIVE STATEMENT
Pt presents to the ED for AMS and slurred speech as per family. Left sided facial droop noted, Code stroke called. Pt is AOx0, PERRLA, and breathing is even and unlabored. Pt grimaced when the R. hip was palpated, leg is abducted and shorter then the left. Edema noted bilaterally to the lower legs. Family states "she has Pt presents to the ED c/o AMS and slurred speech as per family. LKW [id number] as per family. Left sided facial droop noted, Code stroke called. Pt is AOx0, PERRLA, breathing is even and unlabored bilaterally. Pt grimaced when the R. hip was palpated, leg is abducted and shorter then the left. +2 pitting edema noted bilaterally to the lower legs, blle is new as per family. Family states "she hasn't been herself the last 2 weeks." Pt is on the CM, NSR,  in place. Bed is locked and in the lowest position.

## 2025-01-17 NOTE — ED PROVIDER NOTE - ATTENDING CONTRIBUTION TO CARE
Dr. Oquendo: I personally saw the patient with the resident and performed a substantive portion of the visit. I performed a face to face bedside interview with patient regarding history of present illness, review of symptoms and past medical history. I completed an independent physical exam and all aspects of medical decision making. I have discussed patient's plan of care with resident. I agree with note as stated above, having amended the EMR as needed to reflect my findings. This includes HISTORY OF PRESENT ILLNESS, HIV, PAST MEDICAL/SURGICAL/FAMILY/SOCIAL HISTORY, ALLERGIES AND HOME MEDICATIONS, ROS, PHYSICAL EXAM, MEDICAL DECISION MAKING and any PROGRESS NOTES during the time I functioned as the attending physician for this patient.  SEE MDM

## 2025-01-17 NOTE — ED ADULT TRIAGE NOTE - CHIEF COMPLAINT QUOTE
Patient presents to ER C/O left sided facial droop, AMS and weakness, family reported to EMS weakness X 1 week worsened this AM whe patient fell, patient is currently awake, slurred speech noted, Code stroke activated, resp even/unlabored.

## 2025-01-17 NOTE — H&P ADULT - NSHPLABSRESULTS_GEN_ALL_CORE
10.2   6.88  )-----------( 144      ( 17 Jan 2025 14:35 )             30.7     17 Jan 2025 14:35    133    |  96     |  30.2   ----------------------------<  79     4.7     |  25.0   |  0.93     Ca    9.3        17 Jan 2025 14:35    TPro  6.4    /  Alb  3.2    /  TBili  0.3    /  DBili  x      /  AST  37     /  ALT  32     /  AlkPhos  80     17 Jan 2025 14:35    PT/INR - ( 17 Jan 2025 14:35 )   PT: 11.4 sec;   INR: 0.98 ratio         PTT - ( 17 Jan 2025 14:35 )  PTT:36.6 sec  CAPILLARY BLOOD GLUCOSE      POCT Blood Glucose.: 86 mg/dL (17 Jan 2025 14:28)    LIVER FUNCTIONS - ( 17 Jan 2025 14:35 )  Alb: 3.2 g/dL / Pro: 6.4 g/dL / ALK PHOS: 80 U/L / ALT: 32 U/L / AST: 37 U/L / GGT: x           Urinalysis Basic - ( 17 Jan 2025 19:58 )    Color: Yellow / Appearance: Clear / SG: >1.030 / pH: x  Gluc: x / Ketone: Trace mg/dL  / Bili: Negative / Urobili: 0.2 mg/dL   Blood: x / Protein: Negative mg/dL / Nitrite: Negative   Leuk Esterase: Negative / RBC: 1 /HPF / WBC 0 /HPF   Sq Epi: x / Non Sq Epi: 1 /HPF / Bacteria: Moderate /HPF    < from: US Duplex Venous Lower Ext Complete, Bilateral (01.17.25 @ 20:45) >    IMPRESSION:  No evidence of deep venous thrombosis in either lower extremity.      < end of copied text >    < from: CT Lower Extremity No Cont, Right (01.17.25 @ 20:30) >    IMPRESSION:    No evidence of acute fracture or dislocation.    Severe right hip arthrosis.    < end of copied text >    < from: CT Abdomen and Pelvis No Cont (01.17.25 @ 20:29) >    IMPRESSION: No acute abnormality.    < end of copied text >    < from: Xray Chest 1 View AP/PA. (01.17.25 @ 19:12) >    IMPRESSION:  Bilateral moderate pleural effusions and/or lower zone airspace   consolidations.    --- End of Report ---    < end of copied text >    < from: Xray Hip 2-3 Views, Right (01.17.25 @ 19:11) >    IMPRESSION: Severe RIGHT hip femoral acetabular superior sclerotic joint   space narrowing with 2.2 cm lateral fracture fragment.    --- End of Report ---< from: CT Brain Perfusion Maps Stroke (01.17.25 @ 14:57) >    CT perfusion:  No evidence of CT perfusion deficit.      CTA of the intracranial circulation.  No evidence of stenosis. No evidence of aneurysm.      CTA of the extracranial circulation.  No evidence of carotid stenosis.    < end of copied text >    < from: CT Angio Neck Stroke Protocol w/ IV Cont (01.17.25 @ 14:57) >    IMPRESSION:    CT of the brain:  No acute intracranial injury. Microvascular disease. Tiny bilateral basal   ganglia, external capsule and thalamic chronic lacunar infarcts.

## 2025-01-17 NOTE — H&P ADULT - HISTORY OF PRESENT ILLNESS
89 yo female with pmhx HTN presenting to the ED with worsening left facial droop since 08:00 this morning.  89 yo female with pmhx HTN presenting to the ED with worsening left facial droop since 08:00 this morning. Patient is currently AAOx2 and confused. I attempted to reach daughter who she lives with, Chen Beatty (number in chart), but did not get an answer so history mostly obtained from the chart. Patient lives with daughter who is her primary caretaker. Daughter brought patient in after being found on the ground this am. She has a left facial droop that apparently worsened this am, but initially started 2 weeks ago. She has no history of stroke in the past. Patient is AAOx2, basline unkown, but per ED chart, daughter states patient has been declining over the past year, and even more rapidly in the last two weeks. Patient do not endorse any complaints now.

## 2025-01-17 NOTE — ED PROVIDER NOTE - PROGRESS NOTE DETAILS
Ortho consulted for right femoral neck fracture. Still pending CT trauma scans. Remains HD stable.   Samer Michael Loaiza MD CT without traumatic fractures. Remains AxO1, otherwise HD stable. Patient to be admitted at this time for stroke eval/MRI in the AM. Ortho consulted for a possible right femoral neck fracture. Still pending CT trauma scans. Remains HD stable.   Samer Michael Loaiza MD

## 2025-01-17 NOTE — ED ADULT NURSE REASSESSMENT NOTE - NS ED NURSE REASSESS COMMENT FT1
Patient straight cathed for urine using sterile technique. Second RN present to confirm sterility. Explained procedure as it was being done. Pt tolerated procedure well. Sterile specimens collected and sent to lab as ordered. Pt comfort and safety provided.

## 2025-01-17 NOTE — ED ADULT NURSE NOTE - NSFALLHARMRISKINTERV_ED_ALL_ED

## 2025-01-17 NOTE — ED PROVIDER NOTE - CLINICAL SUMMARY MEDICAL DECISION MAKING FREE TEXT BOX
Darshana Oquendo MD, Attending      Gen: Well appearing in NAD   Head: NC/AT  Neck: trachea midline  Resp:  No distress  Ext: no deformities  Neuro:  A&O appears non focal  Skin:  Warm and dry as visualized  Psych:  Normal affect and mood    ddx includes, but is not limited to the following:  plan:  update: see progress notes Darshana Oquendo MD, Attending  89-year-old female past medical history of hypertension, no diuretic, presents with  worsening left facial droop since 8 AM this morning.  Per EMS, patient has been having roughly 2 weeks of facial droop.  Patient also may have had a fall this morning.   In the ED patient is AOx self.     Additional history obtained from daughter at bedside, states patient was found on the floor this morning, unwitnessed fall.   Also daughter is patient's primary caregiver, no help at home, daughter does not feel like she can take care of patient given her slow mental status decline over the past year, and acute worsening in the past 2 weeks.    GEN: Patient awake alert NAD.   HEENT: normocephalic, atraumatic, + L facial droop, slurred speech forehead sparing  CARDIAC: RRR, S1, S2, no murmur.   PULM: CTA B/L no wheeze, rhonchi, rales.   ABD: soft NT, ND  MSK: Moving all extremities, + pitting edema b/l  NEURO: A&Ox1, no weakness of upper or lower extremity, sensation intact.   SKIN: warm, dry, no rash.  ddx includes, but is not limited to the following: stroke, uti, acute fracture. traumatic injury/ hemorrhage.   plan: stroke imaging, CT and xrays to ro acute injuries, UA, neuro consult for MRI. Anticipate admission.   update: see progress notes Darshana Oquendo MD, Attending  89-year-old female past medical history of hypertension, no diuretic, presents with  worsening left facial droop since 8 AM this morning.  Per EMS, patient has been having roughly 2 weeks of facial droop.  Patient also may have had a fall this morning.   In the ED patient is AOx self.     Additional history obtained from daughter at bedside, states patient was found on the floor this morning, unwitnessed fall.   Also daughter is patient's primary caregiver, no help at home, daughter does not feel like she can take care of patient given her slow mental status decline over the past year, and acute worsening in the past 2 weeks.    GEN: Patient awake alert NAD.   HEENT: normocephalic, atraumatic, + L facial droop, slurred speech forehead sparing  CARDIAC: RRR, S1, S2, no murmur.   PULM: CTA B/L no wheeze, rhonchi, rales.   ABD: soft NT, ND  MSK: Moving all extremities, + pitting edema b/l  NEURO: A&Ox1, no weakness of upper or lower extremity, sensation intact.   SKIN: warm, dry, no rash.  ddx includes, but is not limited to the following: stroke, uti, acute fracture. traumatic injury/ hemorrhage.   plan: stroke imaging, CT and xrays to ro acute injuries, UA, neuro consult for MRI. Anticipate admission.   update: pt signed out to PM team pending CT and admission.

## 2025-01-17 NOTE — CONSULT NOTE ADULT - SUBJECTIVE AND OBJECTIVE BOX
Pt Name: HEMANT DURAND    MRN: [id number]      Patient is a 89y Female presenting to the emergency department s/p fall. Patient [place], has family at bedside. Patient lives w daughter and uses wheelchair. Unable to ambulate secondary to hip pain and weakness. Patient is able to stand for some ADLs such as getting dressed and for bathing, transfers to chair and bed. Patient has had 3 falls over past 2 weeks and seems to be mildly confused. Patient admitted for stroke workup. Patient has "baseline pain" in her right hip. If she tries to ambulate it hurts. Yet nothing more than normal.   Family expresses concern about her B/L LE edema.           REVIEW OF SYSTEMS      General: denies SOB or CP	    Musculoskeletal:	 see HPI 	    ROS is otherwise negative.      PAST MEDICAL & SURGICAL HISTORY:  HTN (hypertension)      No significant past surgical history          Allergies: No Known Allergies      Medications:     FAMILY HISTORY:  No pertinent family history in first degree relatives    : non-contributory    Social History:     Ambulation: Wheelchair                           10.2   6.88  )-----------( 144      ( 17 Jan 2025 14:35 )             30.7     01-17    133[L]  |  96  |  30.2[H]  ----------------------------<  79  4.7   |  25.0  |  0.93    Ca    9.3      17 Jan 2025 14:35    TPro  6.4[L]  /  Alb  3.2[L]  /  TBili  0.3[L]  /  DBili  x   /  AST  37[H]  /  ALT  32  /  AlkPhos  80  01-17      PHYSICAL EXAM:    Vital Signs Last 24 Hrs  T(C): 36.6 (17 Jan 2025 16:18), Max: 36.7 (17 Jan 2025 14:25)  T(F): 97.9 (17 Jan 2025 16:18), Max: 98 (17 Jan 2025 14:25)  HR: 95 (17 Jan 2025 16:18) (95 - 96)  BP: 157/71 (17 Jan 2025 16:18) (157/71 - 164/75)  BP(mean): --  RR: 19 (17 Jan 2025 16:18) (19 - 20)  SpO2: 99% (17 Jan 2025 16:18) (98% - 99%)    Parameters below as of 17 Jan 2025 16:18  Patient On (Oxygen Delivery Method): room air      Daily     Daily     Appearance: Alert, responsive, in no acute distress.  Neurological: Sensation is grossly intact to light touch.   Skin: no rash on visible skin. Skin is clean, dry and intact.   Vascular: B/L LE pitting edema noted  Right LE stiff w attempted ROM of right hip.       Imaging Studies: ACC: [id number] EXAM:  CT LWR EXT RT   ORDERED BY: CHIO OH     PROCEDURE DATE:  01/17/2025          INTERPRETATION:  HISTORY: Pain.    Helical CT imaging of the right lower extremity from the level of the hip   to the level of the tibia/fibula was performed without intravenous   contrast. Sagittal and coronal reformats were provided. 3-D reformats   were performed on a separate workstation.    Correlation is made with radiographs obtained same day.    FINDINGS:    No evidence of acute fracture or dislocation.    Severe right hip arthrosis with bone-on-bone contact and remodeling of   the femoral head. Multiple loose intra-articular bodies. Evidence of   acetabular dysplasia. Moderate tricompartmental arthrosis of the knee,   most prominent in the medial tibiofemoral compartment. No evidence of   knee joint effusion.    Subcutaneous edema about the knee. Atherosclerotic disease.    Excreted intravenous contrast material within the urinary bladder.   Colonic diverticulosis.    IMPRESSION:    No evidence of acute fracture or dislocation.    Severe right hip arthrosis.    --- End of Report ---    SHANNON MOORE MD; Attending Radiologist  This document has been electronically signed. Jan 17 2025  8:55PM      A/P:  Pt is a  89y Female with severe right hip OA/hip dysplasia     PLAN:   * Pain control as needed  * No orthopedic surgical intervention required.  * Ortho will sign off

## 2025-01-18 ENCOUNTER — RESULT REVIEW (OUTPATIENT)
Age: 89
End: 2025-01-18

## 2025-01-18 LAB
A1C WITH ESTIMATED AVERAGE GLUCOSE RESULT: 5.1 % — SIGNIFICANT CHANGE UP (ref 4–5.6)
ANION GAP SERPL CALC-SCNC: 12 MMOL/L — SIGNIFICANT CHANGE UP (ref 5–17)
BUN SERPL-MCNC: 25.7 MG/DL — HIGH (ref 8–20)
CALCIUM SERPL-MCNC: 9.4 MG/DL — SIGNIFICANT CHANGE UP (ref 8.4–10.5)
CHLORIDE SERPL-SCNC: 98 MMOL/L — SIGNIFICANT CHANGE UP (ref 96–108)
CHOLEST SERPL-MCNC: 146 MG/DL — SIGNIFICANT CHANGE UP
CO2 SERPL-SCNC: 25 MMOL/L — SIGNIFICANT CHANGE UP (ref 22–29)
CREAT SERPL-MCNC: 0.81 MG/DL — SIGNIFICANT CHANGE UP (ref 0.5–1.3)
EGFR: 68 ML/MIN/1.73M2 — SIGNIFICANT CHANGE UP
ESTIMATED AVERAGE GLUCOSE: 100 MG/DL — SIGNIFICANT CHANGE UP (ref 68–114)
GLUCOSE SERPL-MCNC: 74 MG/DL — SIGNIFICANT CHANGE UP (ref 70–99)
HCT VFR BLD CALC: 30.4 % — LOW (ref 34.5–45)
HDLC SERPL-MCNC: 78 MG/DL — SIGNIFICANT CHANGE UP
HGB BLD-MCNC: 9.9 G/DL — LOW (ref 11.5–15.5)
LACTATE SERPL-SCNC: 0.8 MMOL/L — SIGNIFICANT CHANGE UP (ref 0.5–2)
LIPID PNL WITH DIRECT LDL SERPL: 56 MG/DL — SIGNIFICANT CHANGE UP
MCHC RBC-ENTMCNC: 29.8 PG — SIGNIFICANT CHANGE UP (ref 27–34)
MCHC RBC-ENTMCNC: 32.6 G/DL — SIGNIFICANT CHANGE UP (ref 32–36)
MCV RBC AUTO: 91.6 FL — SIGNIFICANT CHANGE UP (ref 80–100)
NON HDL CHOLESTEROL: 68 MG/DL — SIGNIFICANT CHANGE UP
PLATELET # BLD AUTO: 138 K/UL — LOW (ref 150–400)
POTASSIUM SERPL-MCNC: 4.6 MMOL/L — SIGNIFICANT CHANGE UP (ref 3.5–5.3)
POTASSIUM SERPL-SCNC: 4.6 MMOL/L — SIGNIFICANT CHANGE UP (ref 3.5–5.3)
RBC # BLD: 3.32 M/UL — LOW (ref 3.8–5.2)
RBC # FLD: 15.6 % — HIGH (ref 10.3–14.5)
SODIUM SERPL-SCNC: 135 MMOL/L — SIGNIFICANT CHANGE UP (ref 135–145)
TRIGL SERPL-MCNC: 57 MG/DL — SIGNIFICANT CHANGE UP
TSH SERPL-MCNC: 6.05 UIU/ML — HIGH (ref 0.27–4.2)
WBC # BLD: 6.97 K/UL — SIGNIFICANT CHANGE UP (ref 3.8–10.5)
WBC # FLD AUTO: 6.97 K/UL — SIGNIFICANT CHANGE UP (ref 3.8–10.5)

## 2025-01-18 PROCEDURE — 93306 TTE W/DOPPLER COMPLETE: CPT | Mod: 26

## 2025-01-18 PROCEDURE — 70551 MRI BRAIN STEM W/O DYE: CPT | Mod: 26

## 2025-01-18 PROCEDURE — 99233 SBSQ HOSP IP/OBS HIGH 50: CPT

## 2025-01-18 RX ORDER — PIPERACILLIN SODIUM AND TAZOBACTAM SODIUM 2; 250 G/50ML; MG/50ML
3.38 INJECTION, POWDER, FOR SOLUTION INTRAVENOUS ONCE
Refills: 0 | Status: COMPLETED | OUTPATIENT
Start: 2025-01-18 | End: 2025-01-18

## 2025-01-18 RX ORDER — PIPERACILLIN SODIUM AND TAZOBACTAM SODIUM 2; 250 G/50ML; MG/50ML
3.38 INJECTION, POWDER, FOR SOLUTION INTRAVENOUS ONCE
Refills: 0 | Status: DISCONTINUED | OUTPATIENT
Start: 2025-01-18 | End: 2025-01-18

## 2025-01-18 RX ORDER — LOSARTAN POTASSIUM 100 MG
100 TABLET ORAL DAILY
Refills: 0 | Status: DISCONTINUED | OUTPATIENT
Start: 2025-01-18 | End: 2025-01-20

## 2025-01-18 RX ORDER — BACTERIOSTATIC SODIUM CHLORIDE 0.9 %
1000 VIAL (ML) INJECTION
Refills: 0 | Status: DISCONTINUED | OUTPATIENT
Start: 2025-01-18 | End: 2025-01-20

## 2025-01-18 RX ORDER — ATENOLOL 50 MG
25 TABLET ORAL DAILY
Refills: 0 | Status: DISCONTINUED | OUTPATIENT
Start: 2025-01-18 | End: 2025-01-20

## 2025-01-18 RX ORDER — ASPIRIN 81 MG/1
300 TABLET, COATED ORAL DAILY
Refills: 0 | Status: DISCONTINUED | OUTPATIENT
Start: 2025-01-18 | End: 2025-01-19

## 2025-01-18 RX ADMIN — PIPERACILLIN SODIUM AND TAZOBACTAM SODIUM 200 GRAM(S): 2; 250 INJECTION, POWDER, FOR SOLUTION INTRAVENOUS at 09:51

## 2025-01-18 RX ADMIN — ENOXAPARIN SODIUM 40 MILLIGRAM(S): 100 INJECTION SUBCUTANEOUS at 22:40

## 2025-01-18 RX ADMIN — ASPIRIN 300 MILLIGRAM(S): 81 TABLET, COATED ORAL at 16:31

## 2025-01-18 RX ADMIN — ASPIRIN 81 MILLIGRAM(S): 81 TABLET, COATED ORAL at 00:01

## 2025-01-18 RX ADMIN — Medication 25 MILLIGRAM(S): at 05:41

## 2025-01-18 RX ADMIN — ATORVASTATIN CALCIUM 40 MILLIGRAM(S): 80 TABLET, FILM COATED ORAL at 00:01

## 2025-01-18 RX ADMIN — Medication 75 MILLILITER(S): at 22:39

## 2025-01-18 RX ADMIN — Medication 100 MILLIGRAM(S): at 05:41

## 2025-01-18 NOTE — OCCUPATIONAL THERAPY INITIAL EVALUATION ADULT - PERTINENT HX OF CURRENT PROBLEM, REHAB EVAL
Pt with unwitnessed fall prior to arrival, daughter brought patient in after being found on the ground this am. Imaging initially concerning for hip fx (Xray) but CT did not show fracture, just severe his arthrosis. Ortho evaluated and signed off, no restrictions

## 2025-01-18 NOTE — PROGRESS NOTE ADULT - SUBJECTIVE AND OBJECTIVE BOX
[facility] Division of Medicine    SUBJECTIVE / OVERNIGHT EVENTS: No overnight events as per RN. Pt seen at the bedside. Endorses feeling well. Denies any new complaints. All other systems reviewed and are negative.    MEDICATIONS  (STANDING):  aspirin enteric coated 81 milliGRAM(s) Oral daily  atenolol  Tablet 25 milliGRAM(s) Oral daily  atorvastatin 40 milliGRAM(s) Oral at bedtime  enoxaparin Injectable 40 milliGRAM(s) SubCutaneous every 24 hours  losartan 100 milliGRAM(s) Oral daily    MEDICATIONS  (PRN):  acetaminophen     Tablet .. 650 milliGRAM(s) Oral every 6 hours PRN Temp greater or equal to 38C (100.4F), Mild Pain (1 - 3)  aluminum hydroxide/magnesium hydroxide/simethicone Suspension 30 milliLiter(s) Oral every 4 hours PRN Dyspepsia  melatonin 3 milliGRAM(s) Oral at bedtime PRN Insomnia  ondansetron Injectable 4 milliGRAM(s) IV Push every 8 hours PRN Nausea and/or Vomiting      I&O's Summary      acetaminophen     Tablet .. 650 milliGRAM(s) Oral every 6 hours PRN  aluminum hydroxide/magnesium hydroxide/simethicone Suspension 30 milliLiter(s) Oral every 4 hours PRN  aspirin enteric coated 81 milliGRAM(s) Oral daily  atenolol  Tablet 25 milliGRAM(s) Oral daily  atorvastatin 40 milliGRAM(s) Oral at bedtime  enoxaparin Injectable 40 milliGRAM(s) SubCutaneous every 24 hours  losartan 100 milliGRAM(s) Oral daily  melatonin 3 milliGRAM(s) Oral at bedtime PRN  ondansetron Injectable 4 milliGRAM(s) IV Push every 8 hours PRN      PHYSICAL EXAM:  Vital Signs Last 24 Hrs  T(C): 36.4 (18 Jan 2025 10:51), Max: 36.9 (18 Jan 2025 10:00)  T(F): 97.6 (18 Jan 2025 10:51), Max: 98.4 (18 Jan 2025 10:00)  HR: 73 (18 Jan 2025 10:51) (55 - 95)  BP: 134/65 (18 Jan 2025 10:51) (105/69 - 169/63)  BP(mean): 88 (18 Jan 2025 04:45) (88 - 88)  RR: 20 (18 Jan 2025 10:51) (18 - 20)  SpO2: 91% (18 Jan 2025 10:51) (91% - 99%)    Parameters below as of 18 Jan 2025 10:51  Patient On (Oxygen Delivery Method): room air          CONSTITUTIONAL: no apparent distress  EYES: PERRLA  ENMT: Oral mucosa with moist membranes  RESP: No respiratory distress, clear to auscultation bilaterally, no wheezes or rales  CV: RRR, +S1S2, no peripheral edema  GI: Soft, NT, ND, no rebound, no guarding  MSK: Normal ROM without pain, normal muscle strength/tone  SKIN: No rashes or ulcers noted  PSYCH: A+O x 3, mood and affect appropriate  NEURO: Cooperative, upper and lower motor function grossly intact bilaterally, sensation grossly intact throughout      LABS:                        9.9    6.97  )-----------( 138      ( 18 Jan 2025 03:47 )             30.4     01-18    135  |  98  |  25.7[H]  ----------------------------<  74  4.6   |  25.0  |  0.81    Ca    9.4      18 Jan 2025 03:47    TPro  6.4[L]  /  Alb  3.2[L]  /  TBili  0.3[L]  /  DBili  x   /  AST  37[H]  /  ALT  32  /  AlkPhos  80  01-17    PT/INR - ( 17 Jan 2025 14:35 )   PT: 11.4 sec;   INR: 0.98 ratio         PTT - ( 17 Jan 2025 14:35 )  PTT:36.6 sec      Urinalysis Basic - ( 18 Jan 2025 03:47 )    Color: x / Appearance: x / SG: x / pH: x  Gluc: 74 mg/dL / Ketone: x  / Bili: x / Urobili: x   Blood: x / Protein: x / Nitrite: x   Leuk Esterase: x / RBC: x / WBC x   Sq Epi: x / Non Sq Epi: x / Bacteria: x        CAPILLARY BLOOD GLUCOSE          IMAGING:                                   [facility] Division of Medicine    SUBJECTIVE / OVERNIGHT EVENTS: No overnight events as per RN. Pt seen at the bedside. Pt lethargic. ROS unable to be done.    MEDICATIONS  (STANDING):  aspirin enteric coated 81 milliGRAM(s) Oral daily  atenolol  Tablet 25 milliGRAM(s) Oral daily  atorvastatin 40 milliGRAM(s) Oral at bedtime  enoxaparin Injectable 40 milliGRAM(s) SubCutaneous every 24 hours  losartan 100 milliGRAM(s) Oral daily    MEDICATIONS  (PRN):  acetaminophen     Tablet .. 650 milliGRAM(s) Oral every 6 hours PRN Temp greater or equal to 38C (100.4F), Mild Pain (1 - 3)  aluminum hydroxide/magnesium hydroxide/simethicone Suspension 30 milliLiter(s) Oral every 4 hours PRN Dyspepsia  melatonin 3 milliGRAM(s) Oral at bedtime PRN Insomnia  ondansetron Injectable 4 milliGRAM(s) IV Push every 8 hours PRN Nausea and/or Vomiting      I&O's Summary      acetaminophen     Tablet .. 650 milliGRAM(s) Oral every 6 hours PRN  aluminum hydroxide/magnesium hydroxide/simethicone Suspension 30 milliLiter(s) Oral every 4 hours PRN  aspirin enteric coated 81 milliGRAM(s) Oral daily  atenolol  Tablet 25 milliGRAM(s) Oral daily  atorvastatin 40 milliGRAM(s) Oral at bedtime  enoxaparin Injectable 40 milliGRAM(s) SubCutaneous every 24 hours  losartan 100 milliGRAM(s) Oral daily  melatonin 3 milliGRAM(s) Oral at bedtime PRN  ondansetron Injectable 4 milliGRAM(s) IV Push every 8 hours PRN      PHYSICAL EXAM:  Vital Signs Last 24 Hrs  T(C): 36.4 (18 Jan 2025 10:51), Max: 36.9 (18 Jan 2025 10:00)  T(F): 97.6 (18 Jan 2025 10:51), Max: 98.4 (18 Jan 2025 10:00)  HR: 73 (18 Jan 2025 10:51) (55 - 95)  BP: 134/65 (18 Jan 2025 10:51) (105/69 - 169/63)  BP(mean): 88 (18 Jan 2025 04:45) (88 - 88)  RR: 20 (18 Jan 2025 10:51) (18 - 20)  SpO2: 91% (18 Jan 2025 10:51) (91% - 99%)    Parameters below as of 18 Jan 2025 10:51  Patient On (Oxygen Delivery Method): room air          CONSTITUTIONAL: no apparent distress  RESP: No respiratory distress, clear to auscultation bilaterally  CV: RRR, +S1S2, +1 peripheral edema  GI: Soft, does not appear tender  PSYCH: A+O x 0,  NEURO: lethargic, not following commands, retracts to pain, unable to further at this time      LABS:                        9.9    6.97  )-----------( 138      ( 18 Jan 2025 03:47 )             30.4     01-18    135  |  98  |  25.7[H]  ----------------------------<  74  4.6   |  25.0  |  0.81    Ca    9.4      18 Jan 2025 03:47    TPro  6.4[L]  /  Alb  3.2[L]  /  TBili  0.3[L]  /  DBili  x   /  AST  37[H]  /  ALT  32  /  AlkPhos  80  01-17    PT/INR - ( 17 Jan 2025 14:35 )   PT: 11.4 sec;   INR: 0.98 ratio         PTT - ( 17 Jan 2025 14:35 )  PTT:36.6 sec      Urinalysis Basic - ( 18 Jan 2025 03:47 )    Color: x / Appearance: x / SG: x / pH: x  Gluc: 74 mg/dL / Ketone: x  / Bili: x / Urobili: x   Blood: x / Protein: x / Nitrite: x   Leuk Esterase: x / RBC: x / WBC x   Sq Epi: x / Non Sq Epi: x / Bacteria: x        CAPILLARY BLOOD GLUCOSE          IMAGING:

## 2025-01-18 NOTE — OCCUPATIONAL THERAPY INITIAL EVALUATION ADULT - RANGE OF MOTION EXAMINATION, UPPER EXTREMITY
unable to perform formal AROM assessment 2*pt impaired cognition/bilateral UE Passive ROM was WFL  (within functional limits)

## 2025-01-18 NOTE — OCCUPATIONAL THERAPY INITIAL EVALUATION ADULT - LIVES WITH, PROFILE
Pt poor hx, all info taken from daughter bedside. Pt lives in a house with her daughter who is her primary caretaker. Pt daughter is available 24/7. Pt has no PHANI and no steps inside./children

## 2025-01-18 NOTE — OCCUPATIONAL THERAPY INITIAL EVALUATION ADULT - GENERAL OBSERVATIONS, REHAB EVAL
Left pt as received semifowler in bed, NAD, +IV, +Tele, +Primafit on RA A&Ox0. Nonverbal signs of pain absent, pt very confused, family bedside agreeable to evaluation. Pt agreeable to OT evaluation

## 2025-01-18 NOTE — CHART NOTE - NSCHARTNOTEFT_GEN_A_CORE
Called by RN for rectal temp 92.6  Checked with 2 separate rectal thermometers with the same result   Patient already had UA, CT Chest/ab/pel done, all of which were negative, no other SIRS criteria met  Will check blood cultures, TSH, Lactate now; AM labs had just been done, no leukocytosis  Will order one dose of Zosyn, despite unclear source  Place warming blanket and repeat temp in 2 hours Skin normal color for race, warm, dry and intact. No evidence of rash.

## 2025-01-18 NOTE — SWALLOW BEDSIDE ASSESSMENT ADULT - PHARYNGEAL PHASE
inconsistent trigger of swallow; yankauer utilized to suction puree from base of tongue following first two trials. Pt with +swallow elicitation following next trials x3; however overall very weak with inconsistent delay in pharyngeal swallow suspected.  Pt at HIGH risk for aspiration and is not safe for PO intake at this time./Delayed pharyngeal swallow/Decreased laryngeal elevation/Cough post oral intake

## 2025-01-18 NOTE — PHYSICAL THERAPY INITIAL EVALUATION ADULT - PERTINENT HX OF CURRENT PROBLEM, REHAB EVAL
89 yo female with pmhx HTN presenting to the ED with worsening left facial droop since 08:00 this morning. Patient is currently AAOx2 and confused. I attempted to reach daughter who she lives with, Chen Beatty (number in chart), but did not get an answer so history mostly obtained from the chart. Patient lives with daughter who is her primary caretaker. Daughter brought patient in after being found on the ground this am. She has a left facial droop that apparently worsened this am, but initially started 2 weeks ago. She has no history of stroke in the past. Patient is AAOx2, basline unkown, but per ED chart, daughter states patient has been declining over the past year, and even more rapidly in the last two weeks 89 yo female with pmhx HTN presenting to the ED with worsening left facial droop since 08:00 this morning. Per daughter states patient has been declining over the past year, and even more rapidly in the last two weeks

## 2025-01-18 NOTE — PHYSICAL THERAPY INITIAL EVALUATION ADULT - GENERAL OBSERVATIONS, REHAB EVAL
Pt received in bed, + IV, + purewick + family present, breathing on RA in NAD, in 0/10 nonverbal indicators of pain, confused, agreeable to PT eval

## 2025-01-18 NOTE — SWALLOW BEDSIDE ASSESSMENT ADULT - ORAL PHASE
reduced attention to bolus/Decreased anterior-posterior movement of the bolus/Delayed oral transit time

## 2025-01-18 NOTE — OCCUPATIONAL THERAPY INITIAL EVALUATION ADULT - NS ASR FOLLOW COMMAND OT EVAL
follows commands less than 10% of the time. Pt switches between english and Irish, not making sense/unable to answer questions

## 2025-01-18 NOTE — SWALLOW BEDSIDE ASSESSMENT ADULT - COMMENTS
Per chart: "89 yo female with pmhx HTN presenting to the ED with worsening left facial droop since 08:00 this morning. Patient is currently AAOx2 and confused... Patient lives with daughter who is her primary caretaker. Daughter brought patient in after being found on the ground this am. She has a left facial droop that apparently worsened this am, but initially started 2 weeks ago. She has no history of stroke in the past. Patient is AAOx2, basline unkown, but per ED chart, daughter states patient has been declining over the past year, and even more rapidly in the last two weeks. "

## 2025-01-18 NOTE — OCCUPATIONAL THERAPY INITIAL EVALUATION ADULT - ADDITIONAL COMMENTS
Pt has a shower with curtains and grab bars. Pt owns bed rail, RW, w/c, shower chair, commode. Pt is R handed and does not drive.    Of note: pt daughter states pt baseline is A&Ox2-3, ambulates with RW and assist, mod-max A for ADLs (dressing/bathing, toileting), min-mod for bed mobs with rail, independent for self feeding, grooming. Past 2 weeks pt with decline A&Ox0 requiring total assist for all ADLs and transfers to/from bed to w/c only/bedside commode. Pt able to stand but unable to pivot, daughter states would lift her over, no longer ambulatory. Pt incontinent "sometimes"-always wears pull ups

## 2025-01-18 NOTE — SWALLOW BEDSIDE ASSESSMENT ADULT - SWALLOW EVAL: DIAGNOSIS
Severe oral dysphagia characterized by increased oral transit time and reduced attention to bolus. Pharyngeal dysphagia suspected due to inconsistent swallow trigger and +cough x2. Pt is NOT SAFE for PO intake at this time.

## 2025-01-18 NOTE — PHYSICAL THERAPY INITIAL EVALUATION ADULT - ADDITIONAL COMMENTS
Pt poor hx, all info taken from daughter bedside. Pt lives in a house with her daughter who is her primary caretaker. Pt daughter is available 24/7. Pt has no PHANI and no steps inside. Pt owns bed rail, RW, w/c, shower chair, commode.pt daughter states pt baseline is A&Ox2-3, ambulates with RW and assist, mod-max A for ADLs (dressing/bathing, toileting), min-mod for bed mobs with rail, independent for self feeding, grooming. Past 2 weeks pt with decline A&Ox0 requiring total assist for all ADLs and transfers to/from bed to w/c only/bedside commode. Pt able to stand but unable to pivot, daughter states would lift her over, no longer ambulatory.

## 2025-01-18 NOTE — SWALLOW BEDSIDE ASSESSMENT ADULT - SLP GENERAL OBSERVATIONS
Pt received awake in bed, illogical speech, +dried/hard to remove secretions in oral cavity, nonverbal pain indicator not present, daughter present

## 2025-01-19 LAB — GLUCOSE BLDC GLUCOMTR-MCNC: 83 MG/DL — SIGNIFICANT CHANGE UP (ref 70–99)

## 2025-01-19 PROCEDURE — 99233 SBSQ HOSP IP/OBS HIGH 50: CPT

## 2025-01-19 RX ORDER — LEVOTHYROXINE SODIUM 25 UG/1
68 TABLET ORAL AT BEDTIME
Refills: 0 | Status: DISCONTINUED | OUTPATIENT
Start: 2025-01-19 | End: 2025-01-20

## 2025-01-19 RX ORDER — OLANZAPINE 10 MG/1
2.5 TABLET, FILM COATED ORAL DAILY
Refills: 0 | Status: DISCONTINUED | OUTPATIENT
Start: 2025-01-19 | End: 2025-01-20

## 2025-01-19 RX ORDER — ASPIRIN 81 MG/1
81 TABLET, COATED ORAL DAILY
Refills: 0 | Status: DISCONTINUED | OUTPATIENT
Start: 2025-01-19 | End: 2025-01-20

## 2025-01-19 RX ADMIN — ATORVASTATIN CALCIUM 40 MILLIGRAM(S): 80 TABLET, FILM COATED ORAL at 22:06

## 2025-01-19 RX ADMIN — LEVOTHYROXINE SODIUM 68 MICROGRAM(S): 25 TABLET ORAL at 22:06

## 2025-01-19 RX ADMIN — Medication 108 GRAM(S): at 18:15

## 2025-01-19 RX ADMIN — ACETAMINOPHEN, DIPHENHYDRAMINE HCL, PHENYLEPHRINE HCL 3 MILLIGRAM(S): 325; 25; 5 TABLET ORAL at 22:06

## 2025-01-19 RX ADMIN — ASPIRIN 81 MILLIGRAM(S): 81 TABLET, COATED ORAL at 13:37

## 2025-01-19 RX ADMIN — ENOXAPARIN SODIUM 40 MILLIGRAM(S): 100 INJECTION SUBCUTANEOUS at 22:06

## 2025-01-19 RX ADMIN — Medication 108 GRAM(S): at 06:57

## 2025-01-19 RX ADMIN — OLANZAPINE 2.5 MILLIGRAM(S): 10 TABLET, FILM COATED ORAL at 22:06

## 2025-01-19 RX ADMIN — Medication 108 GRAM(S): at 22:07

## 2025-01-19 RX ADMIN — Medication 108 GRAM(S): at 13:35

## 2025-01-19 NOTE — PROGRESS NOTE ADULT - SUBJECTIVE AND OBJECTIVE BOX
Hospitalist Progress Note    Chief Complaint:      SUBJECTIVE / OVERNIGHT EVENTS:  No events overnight, patient seen at bedside, in NAD, AAOx1 unable to obtain ROS due to mental status.    MEDICATIONS  (STANDING):  ampicillin  IVPB 1 Gram(s) IV Intermittent every 6 hours  ampicillin  IVPB      aspirin enteric coated 81 milliGRAM(s) Oral daily  atenolol  Tablet 25 milliGRAM(s) Oral daily  atorvastatin 40 milliGRAM(s) Oral at bedtime  enoxaparin Injectable 40 milliGRAM(s) SubCutaneous every 24 hours  levothyroxine Injectable 68 MICROGram(s) IV Push at bedtime  losartan 100 milliGRAM(s) Oral daily  sodium chloride 0.9%. 1000 milliLiter(s) (75 mL/Hr) IV Continuous <Continuous>    MEDICATIONS  (PRN):  acetaminophen     Tablet .. 650 milliGRAM(s) Oral every 6 hours PRN Temp greater or equal to 38C (100.4F), Mild Pain (1 - 3)  aluminum hydroxide/magnesium hydroxide/simethicone Suspension 30 milliLiter(s) Oral every 4 hours PRN Dyspepsia  melatonin 3 milliGRAM(s) Oral at bedtime PRN Insomnia  OLANZapine 2.5 milliGRAM(s) Oral daily PRN agitation  ondansetron Injectable 4 milliGRAM(s) IV Push every 8 hours PRN Nausea and/or Vomiting        I&O's Summary    19 Jan 2025 07:01  -  20 Jan 2025 07:00  --------------------------------------------------------  IN: 0 mL / OUT: 1900 mL / NET: -1900 mL        PHYSICAL EXAM:  Vital Signs Last 24 Hrs  T(C): 36.3 (20 Jan 2025 04:41), Max: 36.6 (19 Jan 2025 14:31)  T(F): 97.4 (20 Jan 2025 04:41), Max: 97.9 (19 Jan 2025 14:31)  HR: 70 (20 Jan 2025 04:41) (63 - 76)  BP: 164/77 (20 Jan 2025 04:41) (132/79 - 164/77)  BP(mean): --  RR: 18 (20 Jan 2025 04:41) (17 - 19)  SpO2: 95% (20 Jan 2025 04:41) (93% - 99%)    Parameters below as of 20 Jan 2025 04:41  Patient On (Oxygen Delivery Method): room air            CONSTITUTIONAL: no apparent distress  RESP: No respiratory distress, clear to auscultation bilaterally  CV: RRR, +S1S2, +1 peripheral edema  GI: Soft, does not appear tender  PSYCH: A+O x 1  NEURO: lethargic, following basic commands but not speaking    LABS:                        9.7    7.74  )-----------( 159      ( 20 Jan 2025 05:41 )             30.2     01-20    139  |  103  |  19.8  ----------------------------<  66[L]  4.1   |  23.0  |  0.91    Ca    8.9      20 Jan 2025 05:41  Mg     1.8     01-20            Urinalysis Basic - ( 20 Jan 2025 05:41 )    Color: x / Appearance: x / SG: x / pH: x  Gluc: 66 mg/dL / Ketone: x  / Bili: x / Urobili: x   Blood: x / Protein: x / Nitrite: x   Leuk Esterase: x / RBC: x / WBC x   Sq Epi: x / Non Sq Epi: x / Bacteria: x        Culture - Urine (collected 17 Jan 2025 19:58)  Source: Clean Catch Clean Catch (Midstream)  Preliminary Report (19 Jan 2025 22:47):    >100,000 CFU/ml Enterococcus faecalis      CAPILLARY BLOOD GLUCOSE      POCT Blood Glucose.: 83 mg/dL (19 Jan 2025 08:05)        RADIOLOGY & ADDITIONAL TESTS:  Results Reviewed: Y  Imaging Personally Reviewed: N  Electrocardiogram Personally Reviewed: N

## 2025-01-20 VITALS
OXYGEN SATURATION: 91 % | SYSTOLIC BLOOD PRESSURE: 132 MMHG | HEART RATE: 61 BPM | TEMPERATURE: 98 F | RESPIRATION RATE: 18 BRPM | DIASTOLIC BLOOD PRESSURE: 78 MMHG

## 2025-01-20 LAB
-  AMPICILLIN: SIGNIFICANT CHANGE UP
-  CIPROFLOXACIN: SIGNIFICANT CHANGE UP
-  LEVOFLOXACIN: SIGNIFICANT CHANGE UP
-  NITROFURANTOIN: SIGNIFICANT CHANGE UP
-  TETRACYCLINE: SIGNIFICANT CHANGE UP
-  VANCOMYCIN: SIGNIFICANT CHANGE UP
ANION GAP SERPL CALC-SCNC: 13 MMOL/L — SIGNIFICANT CHANGE UP (ref 5–17)
BASOPHILS # BLD AUTO: 0.03 K/UL — SIGNIFICANT CHANGE UP (ref 0–0.2)
BASOPHILS NFR BLD AUTO: 0.4 % — SIGNIFICANT CHANGE UP (ref 0–2)
BUN SERPL-MCNC: 19.8 MG/DL — SIGNIFICANT CHANGE UP (ref 8–20)
CALCIUM SERPL-MCNC: 8.9 MG/DL — SIGNIFICANT CHANGE UP (ref 8.4–10.5)
CHLORIDE SERPL-SCNC: 103 MMOL/L — SIGNIFICANT CHANGE UP (ref 96–108)
CO2 SERPL-SCNC: 23 MMOL/L — SIGNIFICANT CHANGE UP (ref 22–29)
CREAT SERPL-MCNC: 0.91 MG/DL — SIGNIFICANT CHANGE UP (ref 0.5–1.3)
CULTURE RESULTS: ABNORMAL
EGFR: 59 ML/MIN/1.73M2 — LOW
EOSINOPHIL # BLD AUTO: 0.07 K/UL — SIGNIFICANT CHANGE UP (ref 0–0.5)
EOSINOPHIL NFR BLD AUTO: 0.9 % — SIGNIFICANT CHANGE UP (ref 0–6)
GLUCOSE SERPL-MCNC: 66 MG/DL — LOW (ref 70–99)
HCT VFR BLD CALC: 30.2 % — LOW (ref 34.5–45)
HGB BLD-MCNC: 9.7 G/DL — LOW (ref 11.5–15.5)
IMM GRANULOCYTES NFR BLD AUTO: 0.4 % — SIGNIFICANT CHANGE UP (ref 0–0.9)
LYMPHOCYTES # BLD AUTO: 1.57 K/UL — SIGNIFICANT CHANGE UP (ref 1–3.3)
LYMPHOCYTES # BLD AUTO: 20.3 % — SIGNIFICANT CHANGE UP (ref 13–44)
MAGNESIUM SERPL-MCNC: 1.8 MG/DL — SIGNIFICANT CHANGE UP (ref 1.6–2.6)
MCHC RBC-ENTMCNC: 30.4 PG — SIGNIFICANT CHANGE UP (ref 27–34)
MCHC RBC-ENTMCNC: 32.1 G/DL — SIGNIFICANT CHANGE UP (ref 32–36)
MCV RBC AUTO: 94.7 FL — SIGNIFICANT CHANGE UP (ref 80–100)
METHOD TYPE: SIGNIFICANT CHANGE UP
MONOCYTES # BLD AUTO: 0.94 K/UL — HIGH (ref 0–0.9)
MONOCYTES NFR BLD AUTO: 12.1 % — SIGNIFICANT CHANGE UP (ref 2–14)
NEUTROPHILS # BLD AUTO: 5.1 K/UL — SIGNIFICANT CHANGE UP (ref 1.8–7.4)
NEUTROPHILS NFR BLD AUTO: 65.9 % — SIGNIFICANT CHANGE UP (ref 43–77)
ORGANISM # SPEC MICROSCOPIC CNT: ABNORMAL
ORGANISM # SPEC MICROSCOPIC CNT: SIGNIFICANT CHANGE UP
PLATELET # BLD AUTO: 159 K/UL — SIGNIFICANT CHANGE UP (ref 150–400)
POTASSIUM SERPL-MCNC: 4.1 MMOL/L — SIGNIFICANT CHANGE UP (ref 3.5–5.3)
POTASSIUM SERPL-SCNC: 4.1 MMOL/L — SIGNIFICANT CHANGE UP (ref 3.5–5.3)
RBC # BLD: 3.19 M/UL — LOW (ref 3.8–5.2)
RBC # FLD: 16.5 % — HIGH (ref 10.3–14.5)
SODIUM SERPL-SCNC: 139 MMOL/L — SIGNIFICANT CHANGE UP (ref 135–145)
SPECIMEN SOURCE: SIGNIFICANT CHANGE UP
WBC # BLD: 7.74 K/UL — SIGNIFICANT CHANGE UP (ref 3.8–10.5)
WBC # FLD AUTO: 7.74 K/UL — SIGNIFICANT CHANGE UP (ref 3.8–10.5)

## 2025-01-20 PROCEDURE — 99233 SBSQ HOSP IP/OBS HIGH 50: CPT

## 2025-01-20 PROCEDURE — 99497 ADVNCD CARE PLAN 30 MIN: CPT

## 2025-01-20 RX ORDER — HALOPERIDOL 10 MG/1
1 TABLET ORAL
Refills: 0 | Status: DISCONTINUED | OUTPATIENT
Start: 2025-01-20 | End: 2025-01-22

## 2025-01-20 RX ORDER — MORPHINE SULFATE 60 MG/1
2 TABLET, FILM COATED, EXTENDED RELEASE ORAL
Refills: 0 | Status: DISCONTINUED | OUTPATIENT
Start: 2025-01-20 | End: 2025-01-22

## 2025-01-20 RX ADMIN — Medication 108 GRAM(S): at 06:05

## 2025-01-20 RX ADMIN — Medication 108 GRAM(S): at 11:04

## 2025-01-20 RX ADMIN — Medication 100 MILLIGRAM(S): at 06:05

## 2025-01-20 RX ADMIN — Medication 25 MILLIGRAM(S): at 06:05

## 2025-01-20 NOTE — DIETITIAN INITIAL EVALUATION ADULT - ETIOLOGY
Related to inability to meet sufficient protein energy needs in setting of advanced age with confusion, chewing difficulty, skin breakdown

## 2025-01-20 NOTE — DIETITIAN INITIAL EVALUATION ADULT - PERTINENT LABORATORY DATA
01-20    139  |  103  |  19.8  ----------------------------<  66[L]  4.1   |  23.0  |  0.91    Ca    8.9      20 Jan 2025 05:41  Mg     1.8     01-20    A1C with Estimated Average Glucose Result: 5.1 % (01-18-25 @ 03:47)

## 2025-01-20 NOTE — PROGRESS NOTE ADULT - SUBJECTIVE AND OBJECTIVE BOX
Hospitalist Progress Note    Chief Complaint:      SUBJECTIVE / OVERNIGHT EVENTS:  No events overnight, patient seen at bedside, in NAD, AAOx1 unable to obtain ROS due to mental status.    MEDICATIONS  (STANDING):  ampicillin  IVPB 1 Gram(s) IV Intermittent every 6 hours  ampicillin  IVPB      aspirin enteric coated 81 milliGRAM(s) Oral daily  atenolol  Tablet 25 milliGRAM(s) Oral daily  atorvastatin 40 milliGRAM(s) Oral at bedtime  enoxaparin Injectable 40 milliGRAM(s) SubCutaneous every 24 hours  levothyroxine Injectable 68 MICROGram(s) IV Push at bedtime  losartan 100 milliGRAM(s) Oral daily  sodium chloride 0.9%. 1000 milliLiter(s) (75 mL/Hr) IV Continuous <Continuous>    MEDICATIONS  (PRN):  acetaminophen     Tablet .. 650 milliGRAM(s) Oral every 6 hours PRN Temp greater or equal to 38C (100.4F), Mild Pain (1 - 3)  aluminum hydroxide/magnesium hydroxide/simethicone Suspension 30 milliLiter(s) Oral every 4 hours PRN Dyspepsia  melatonin 3 milliGRAM(s) Oral at bedtime PRN Insomnia  OLANZapine 2.5 milliGRAM(s) Oral daily PRN agitation  ondansetron Injectable 4 milliGRAM(s) IV Push every 8 hours PRN Nausea and/or Vomiting        I&O's Summary    19 Jan 2025 07:01  -  20 Jan 2025 07:00  --------------------------------------------------------  IN: 0 mL / OUT: 1900 mL / NET: -1900 mL        PHYSICAL EXAM:  Vital Signs Last 24 Hrs  T(C): 36.2 (20 Jan 2025 07:58), Max: 36.6 (19 Jan 2025 14:31)  T(F): 97.2 (20 Jan 2025 07:58), Max: 97.9 (19 Jan 2025 14:31)  HR: 58 (20 Jan 2025 07:58) (58 - 76)  BP: 156/71 (20 Jan 2025 07:58) (132/79 - 164/77)  BP(mean): --  RR: 19 (20 Jan 2025 07:58) (17 - 19)  SpO2: 94% (20 Jan 2025 07:58) (93% - 99%)    Parameters below as of 20 Jan 2025 07:58  Patient On (Oxygen Delivery Method): room air      CONSTITUTIONAL: no apparent distress  RESP: No respiratory distress, clear to auscultation bilaterally  CV: RRR, +S1S2, +1 peripheral edema  GI: Soft, does not appear tender  PSYCH: A+O x 1  NEURO: lethargic, following basic commands but not speaking    LABS:                        9.7    7.74  )-----------( 159      ( 20 Jan 2025 05:41 )             30.2     01-20    139  |  103  |  19.8  ----------------------------<  66[L]  4.1   |  23.0  |  0.91    Ca    8.9      20 Jan 2025 05:41  Mg     1.8     01-20            Urinalysis Basic - ( 20 Jan 2025 05:41 )    Color: x / Appearance: x / SG: x / pH: x  Gluc: 66 mg/dL / Ketone: x  / Bili: x / Urobili: x   Blood: x / Protein: x / Nitrite: x   Leuk Esterase: x / RBC: x / WBC x   Sq Epi: x / Non Sq Epi: x / Bacteria: x        Culture - Urine (collected 17 Jan 2025 19:58)  Source: Clean Catch Clean Catch (Midstream)  Preliminary Report (19 Jan 2025 22:47):    >100,000 CFU/ml Enterococcus faecalis      CAPILLARY BLOOD GLUCOSE            RADIOLOGY & ADDITIONAL TESTS:  Results Reviewed: Y  Imaging Personally Reviewed: N  Electrocardiogram Personally Reviewed: N

## 2025-01-20 NOTE — GOALS OF CARE CONVERSATION - ADVANCED CARE PLANNING - CONVERSATION DETAILS
GOC discussed with patient's family at bedside and Hospitalist. All questions addressed. Family expressed wish for DNR/DNI, no non-invasive ventilation given patient's  underlying comorbid conditions and poor baseline quality of life. MOLST signed and witnessed, placed in chart. DNR/DNI order updated.    Code Status - DNR/DNI
Due to poor prognosis and severe decline, discussed advanced care planning. Patient family wishes for comfort care measures and [facility]. Patient made comfort care measures only now w/ pleasure feeds. New MOLST completed and placed into chart

## 2025-01-20 NOTE — DIETITIAN INITIAL EVALUATION ADULT - PERTINENT MEDS FT
MEDICATIONS  (STANDING):  ampicillin  IVPB 1 Gram(s) IV Intermittent every 6 hours  aspirin enteric coated 81 milliGRAM(s) Oral daily  atenolol  Tablet 25 milliGRAM(s) Oral daily  atorvastatin 40 milliGRAM(s) Oral at bedtime  enoxaparin Injectable 40 milliGRAM(s) SubCutaneous every 24 hours  levothyroxine Injectable 68 MICROGram(s) IV Push at bedtime  losartan 100 milliGRAM(s) Oral daily  sodium chloride 0.9%. 1000 milliLiter(s) (75 mL/Hr) IV Continuous <Continuous>    MEDICATIONS  (PRN):  acetaminophen     Tablet .. 650 milliGRAM(s) Oral every 6 hours PRN Temp greater or equal to 38C (100.4F), Mild Pain (1 - 3)  melatonin 3 milliGRAM(s) Oral at bedtime PRN Insomnia  ondansetron Injectable 4 milliGRAM(s) IV Push every 8 hours PRN Nausea and/or Vomiting

## 2025-01-20 NOTE — DIETITIAN INITIAL EVALUATION ADULT - ORAL INTAKE PTA/DIET HISTORY
Pt lethargic during visit, confusion noted; unable to provide nutrition history. Breakfast tray at bedside; untouched. Pt requires total assistance noted per chart.   Pt had swallow evaluation on 1/19; diet advanced to soft and bite size.

## 2025-01-20 NOTE — DIETITIAN INITIAL EVALUATION ADULT - OTHER INFO
Pt is a 89 yo female with pmhx HTN presenting to the ED with worsening left facial droop. Patient is currently AAOx2 and confused. She has no history of stroke in the past. Patient is AAOx2, baseline unknown.   Pt admitted with Facial Droop r/o CVA

## 2025-01-21 ENCOUNTER — TRANSCRIPTION ENCOUNTER (OUTPATIENT)
Age: 89
End: 2025-01-21

## 2025-01-21 PROCEDURE — 99239 HOSP IP/OBS DSCHRG MGMT >30: CPT

## 2025-01-21 RX ORDER — HYOSCYAMINE SULFATE 0.125 MG
1 TABLET ORAL
Qty: 120 | Refills: 0
Start: 2025-01-21 | End: 2025-02-19

## 2025-01-21 RX ORDER — LOSARTAN POTASSIUM 100 MG
1 TABLET ORAL
Refills: 0 | DISCHARGE

## 2025-01-21 RX ORDER — MORPHINE SULFATE 60 MG/1
0.25 TABLET, FILM COATED, EXTENDED RELEASE ORAL
Qty: 15 | Refills: 0
Start: 2025-01-21 | End: 2025-01-23

## 2025-01-21 RX ORDER — ATENOLOL 50 MG
1 TABLET ORAL
Refills: 0 | DISCHARGE

## 2025-01-21 RX ADMIN — Medication 0.4 MILLIGRAM(S): at 16:20

## 2025-01-21 RX ADMIN — MORPHINE SULFATE 2 MILLIGRAM(S): 60 TABLET, FILM COATED, EXTENDED RELEASE ORAL at 05:30

## 2025-01-21 RX ADMIN — MORPHINE SULFATE 2 MILLIGRAM(S): 60 TABLET, FILM COATED, EXTENDED RELEASE ORAL at 04:34

## 2025-01-21 NOTE — DISCHARGE NOTE PROVIDER - HOSPITAL COURSE
89 year old female with a PMHx of HTN, who presented to [facility] ED with c/o worsening left sided facial droop and recent unwitnessed fall. In the ED, patient out of the window for tPA. Initial CTH negative for acute pathology. Admitted for further workup. MRI returned negative for acute stroke. Failed multiple S/S evaluations. During hospitalization, patient with fall. CT negative for acute fracture, demonstrated severe arthrosis. No orthopedic intervention recommended. Goals of care were discussed with family at length, patient ultimately transitioned to comfort care with pleasure feeds.

## 2025-01-21 NOTE — DISCHARGE NOTE PROVIDER - ATTENDING DISCHARGE PHYSICAL EXAMINATION:
CONSTITUTIONAL: no apparent distress  RESP: No respiratory distress, clear to auscultation bilaterally  CV: RRR, +S1S2, +1 peripheral edema  GI: Soft, does not appear tender  PSYCH: A+O x 1  NEURO: lethargic, following basic commands but not speaking

## 2025-01-21 NOTE — DISCHARGE NOTE PROVIDER - DETAILS OF MALNUTRITION DIAGNOSIS/DIAGNOSES
This patient has been assessed with a concern for Malnutrition and was treated during this hospitalization for the following Nutrition diagnosis/diagnoses:     -  01/20/2025: Moderate protein-calorie malnutrition

## 2025-01-21 NOTE — DISCHARGE NOTE PROVIDER - NSDCMRMEDTOKEN_GEN_ALL_CORE_FT
atenolol 25 mg oral tablet: 1 tab(s) orally once a day  losartan 100 mg oral tablet: 1 tab(s) orally once a day   hyoscyamine 0.125 mg oral tablet: 1 tab(s) orally 4 times a day as needed for For secretions 1 tab(s) orally by mouth or under the tongue every 6 hours, As Needed for terminal/excessive secretions.  LORazepam 0.5 mg oral tablet: 1 tab(s) orally every 6 hours as needed for -for anxiety MDD: 4 tablets  morphine 20 mg/mL oral concentrate: 0.25 milliliter(s) sublingual every 6 hours as needed for pain or shortness of breath MDD: 1 mL

## 2025-01-21 NOTE — DISCHARGE NOTE PROVIDER - INSTRUCTIONS
Pleasure feeds per instruction  Azathioprine Pregnancy And Lactation Text: This medication is Pregnancy Category D and isn't considered safe during pregnancy. It is unknown if this medication is excreted in breast milk.

## 2025-01-21 NOTE — DISCHARGE NOTE PROVIDER - NSDCCPCAREPLAN_GEN_ALL_CORE_FT
PRINCIPAL DISCHARGE DIAGNOSIS  Diagnosis: Slurred speech  Assessment and Plan of Treatment: - Imaging negative for stroke. Transitioned to comfort care.

## 2025-01-22 ENCOUNTER — TRANSCRIPTION ENCOUNTER (OUTPATIENT)
Age: 89
End: 2025-01-22

## 2025-01-22 PROCEDURE — 72125 CT NECK SPINE W/O DYE: CPT | Mod: MC

## 2025-01-22 PROCEDURE — 71250 CT THORAX DX C-: CPT | Mod: MC

## 2025-01-22 PROCEDURE — 36415 COLL VENOUS BLD VENIPUNCTURE: CPT

## 2025-01-22 PROCEDURE — 85027 COMPLETE CBC AUTOMATED: CPT

## 2025-01-22 PROCEDURE — 85610 PROTHROMBIN TIME: CPT

## 2025-01-22 PROCEDURE — 83605 ASSAY OF LACTIC ACID: CPT

## 2025-01-22 PROCEDURE — 70496 CT ANGIOGRAPHY HEAD: CPT | Mod: MC

## 2025-01-22 PROCEDURE — 83036 HEMOGLOBIN GLYCOSYLATED A1C: CPT

## 2025-01-22 PROCEDURE — 87186 SC STD MICRODIL/AGAR DIL: CPT

## 2025-01-22 PROCEDURE — 87077 CULTURE AEROBIC IDENTIFY: CPT

## 2025-01-22 PROCEDURE — 70450 CT HEAD/BRAIN W/O DYE: CPT | Mod: MC

## 2025-01-22 PROCEDURE — 0042T: CPT | Mod: MC

## 2025-01-22 PROCEDURE — 73552 X-RAY EXAM OF FEMUR 2/>: CPT

## 2025-01-22 PROCEDURE — 83735 ASSAY OF MAGNESIUM: CPT

## 2025-01-22 PROCEDURE — 84484 ASSAY OF TROPONIN QUANT: CPT

## 2025-01-22 PROCEDURE — 93005 ELECTROCARDIOGRAM TRACING: CPT

## 2025-01-22 PROCEDURE — 81001 URINALYSIS AUTO W/SCOPE: CPT

## 2025-01-22 PROCEDURE — 74176 CT ABD & PELVIS W/O CONTRAST: CPT | Mod: MC

## 2025-01-22 PROCEDURE — 85025 COMPLETE CBC W/AUTO DIFF WBC: CPT

## 2025-01-22 PROCEDURE — 73562 X-RAY EXAM OF KNEE 3: CPT

## 2025-01-22 PROCEDURE — 99239 HOSP IP/OBS DSCHRG MGMT >30: CPT

## 2025-01-22 PROCEDURE — 92610 EVALUATE SWALLOWING FUNCTION: CPT

## 2025-01-22 PROCEDURE — 87040 BLOOD CULTURE FOR BACTERIA: CPT

## 2025-01-22 PROCEDURE — 97167 OT EVAL HIGH COMPLEX 60 MIN: CPT

## 2025-01-22 PROCEDURE — 99285 EMERGENCY DEPT VISIT HI MDM: CPT | Mod: 25

## 2025-01-22 PROCEDURE — 80048 BASIC METABOLIC PNL TOTAL CA: CPT

## 2025-01-22 PROCEDURE — 80061 LIPID PANEL: CPT

## 2025-01-22 PROCEDURE — 85730 THROMBOPLASTIN TIME PARTIAL: CPT

## 2025-01-22 PROCEDURE — 84443 ASSAY THYROID STIM HORMONE: CPT

## 2025-01-22 PROCEDURE — 70498 CT ANGIOGRAPHY NECK: CPT | Mod: MC

## 2025-01-22 PROCEDURE — 80053 COMPREHEN METABOLIC PANEL: CPT

## 2025-01-22 PROCEDURE — 83880 ASSAY OF NATRIURETIC PEPTIDE: CPT

## 2025-01-22 PROCEDURE — 93306 TTE W/DOPPLER COMPLETE: CPT

## 2025-01-22 PROCEDURE — 73502 X-RAY EXAM HIP UNI 2-3 VIEWS: CPT

## 2025-01-22 PROCEDURE — 70551 MRI BRAIN STEM W/O DYE: CPT | Mod: MC

## 2025-01-22 PROCEDURE — 73700 CT LOWER EXTREMITY W/O DYE: CPT | Mod: MC

## 2025-01-22 PROCEDURE — 87086 URINE CULTURE/COLONY COUNT: CPT

## 2025-01-22 PROCEDURE — 71045 X-RAY EXAM CHEST 1 VIEW: CPT

## 2025-01-22 PROCEDURE — 82962 GLUCOSE BLOOD TEST: CPT

## 2025-01-22 PROCEDURE — 93970 EXTREMITY STUDY: CPT

## 2025-01-22 RX ADMIN — MORPHINE SULFATE 2 MILLIGRAM(S): 60 TABLET, FILM COATED, EXTENDED RELEASE ORAL at 14:14

## 2025-01-22 RX ADMIN — MORPHINE SULFATE 2 MILLIGRAM(S): 60 TABLET, FILM COATED, EXTENDED RELEASE ORAL at 15:14

## 2025-01-22 NOTE — PROGRESS NOTE ADULT - NUTRITIONAL ASSESSMENT
This patient has been assessed with a concern for Malnutrition and has been determined to have a diagnosis/diagnoses of Moderate protein-calorie malnutrition.    This patient is being managed with:   Diet Soft and Bite Sized-  No Straws  Single Sips Only  Entered: Jan 19 2025  4:33PM  
This patient has been assessed with a concern for Malnutrition and has been determined to have a diagnosis/diagnoses of Moderate protein-calorie malnutrition.    This patient is being managed with:   Diet Soft and Bite Sized-  No Straws  Single Sips Only  Entered: Jan 19 2025  4:33PM

## 2025-01-22 NOTE — PROGRESS NOTE ADULT - REASON FOR ADMISSION
Facial droop, subacute vs chronic stroke; fall

## 2025-01-22 NOTE — DISCHARGE NOTE NURSING/CASE MANAGEMENT/SOCIAL WORK - NSDCVIVACCINE_GEN_ALL_CORE_FT
Tdap; 10-Oct-2021 19:43; Indu Lyons (RN); [employer]; [id number] (Exp. Date: 15-Jul-2023); IntraMuscular; Deltoid Right.; 0.5 milliLiter(s); VIS (VIS Published: 09-May-2013, VIS Presented: 10-Oct-2021);

## 2025-01-22 NOTE — PROGRESS NOTE ADULT - TIME BILLING
chart review, patient evaluation, documentation, coordination of care and discussion with nurses and patient's family.
Time spent reviewing the chart documentation, reviewing labs and imaging studies, evaluating the patient, discussing the plan of care with the medical team and/or all necessary consultants, and documenting.

## 2025-01-22 NOTE — DISCHARGE NOTE NURSING/CASE MANAGEMENT/SOCIAL WORK - FINANCIAL ASSISTANCE
[facility] provides services at a reduced cost to those who are determined to be eligible through [facility]’s financial assistance program. Information regarding [facility]’s financial assistance program can be found by going to [email or web address] or by calling [phone].

## 2025-01-22 NOTE — PROGRESS NOTE ADULT - SUBJECTIVE AND OBJECTIVE BOX
Hospitalist Progress Note    Chief Complaint:      SUBJECTIVE / OVERNIGHT EVENTS:  No events overnight, patient seen at bedside, in NAD, AAOx1 unable to obtain ROS due to mental status.      MEDICATIONS  (STANDING):    MEDICATIONS  (PRN):  acetaminophen     Tablet .. 650 milliGRAM(s) Oral every 6 hours PRN Temp greater or equal to 38C (100.4F), Mild Pain (1 - 3)  glycopyrrolate Injectable 0.4 milliGRAM(s) IV Push four times a day PRN Secretions  haloperidol    Injectable 1 milliGRAM(s) IV Push every 2 hours PRN Agitation  LORazepam   Injectable 0.5 milliGRAM(s) IV Push every 4 hours PRN Anxiety  morphine  - Injectable 2 milliGRAM(s) IV Push every 2 hours PRN Moderate pain (4-6), Severe pain (7-10), Respiratory rate greater than 22        I&O's Summary      PHYSICAL EXAM:  Vital Signs Last 24 Hrs  T(C): --  T(F): --  HR: --  BP: --  BP(mean): --  RR: --  SpO2: --          CONSTITUTIONAL: no apparent distress  RESP: No respiratory distress, clear to auscultation bilaterally  CV: RRR, +S1S2, +1 peripheral edema  GI: Soft, does not appear tender  PSYCH: A+O x 0  NEURO: lethargic, not responsive    LABS:                    CAPILLARY BLOOD GLUCOSE            RADIOLOGY & ADDITIONAL TESTS:  Results Reviewed: Y  Imaging Personally Reviewed: N  Electrocardiogram Personally Reviewed: N

## 2025-01-22 NOTE — DISCHARGE NOTE NURSING/CASE MANAGEMENT/SOCIAL WORK - PATIENT PORTAL LINK FT
You can access the FollowMyHealth Patient Portal offered by [facility] by registering at the following website: [email or web address]. By joining [facility]’s FollowMyHealth portal, you will also be able to view your health information using other applications (apps) compatible with our system.

## 2025-01-22 NOTE — PROGRESS NOTE ADULT - ASSESSMENT
89 yo female with pmhx HTN presenting to the ED with worsening left facial droop since 08:00 this morning.     Facial Droop r/o CVA  - Neuro checks and vitals signs Q4  - Out of TPA window  - c/w Aspirin 81 mg, Atorvastatin 40 mg   - Check TTE  - MRI read pending  - TSH elevated at 6, discussed with family, start IV synthroid as benefits outweigh risks  - PT/OT/SLP evaluations pending  - NPO pending dysphagia screen, then can resume with puree diet    Fall  - Unwitnessed fall prior to arrival, and second fall while here  - Imaging initially concerning for hip fx (Xray) but CT did not show fracture, just severe his arthrosis  - Ortho evaluated and signed off, no restrictions  - Fall risk protocol  - PT consulted    HTN  - Continue Atenolol 25 mg daily  - Continue Losartan 100 mg daily    VTEppx: Lovenox, SCDs  GOC: DNR/DNI, discussed with daughter at bedside, MOLST filled out and placed in chart  Dispo: Pending PT eval, likely [facility] vs long term placement      
89 yo female with pmhx HTN presenting to the ED with worsening left facial droop since 08:00 this morning.     Facial Droop r/o CVA  - Neuro checks and vitals signs Q4  - Out of TPA window  - c/w Aspirin 81 mg, Atorvastatin 40 mg   - MRI reveals chronic changes no new strokes  - TSH elevated at 6, discussed with family, start IV synthroid as benefits outweigh risks  - Soft and Bite Size    Fall  - Unwitnessed fall prior to arrival, and second fall while here  - Imaging initially concerning for hip fx (Xray) but CT did not show fracture, just severe his arthrosis  - Ortho evaluated and signed off, no restrictions  - Fall risk protocol  - PT consulted    HTN  - Continue Atenolol 25 mg daily  - Continue Losartan 100 mg daily    VTEppx: Lovenox, SCDs  GOC: DNR/DN  Dispo: Likely [facility] w/ hospice to follow in the community  

## 2025-01-24 LAB
CULTURE RESULTS: SIGNIFICANT CHANGE UP
SPECIMEN SOURCE: SIGNIFICANT CHANGE UP
